# Patient Record
Sex: FEMALE | Race: WHITE
[De-identification: names, ages, dates, MRNs, and addresses within clinical notes are randomized per-mention and may not be internally consistent; named-entity substitution may affect disease eponyms.]

---

## 2017-01-07 ENCOUNTER — HOSPITAL ENCOUNTER (EMERGENCY)
Dept: HOSPITAL 75 - ER | Age: 67
Discharge: HOME | End: 2017-01-07
Payer: MEDICARE

## 2017-01-07 VITALS — SYSTOLIC BLOOD PRESSURE: 151 MMHG | DIASTOLIC BLOOD PRESSURE: 92 MMHG

## 2017-01-07 VITALS — BODY MASS INDEX: 37.9 KG/M2 | HEIGHT: 64 IN | WEIGHT: 222 LBS

## 2017-01-07 DIAGNOSIS — J18.9: Primary | ICD-10-CM

## 2017-01-07 LAB
ALBUMIN SERPL-MCNC: 4.3 G/DL (ref 3.2–4.5)
ALT SERPL-CCNC: 31 U/L (ref 0–55)
ANION GAP SERPL CALC-SCNC: 12 MMOL/L (ref 5–14)
AST SERPL-CCNC: 29 U/L (ref 5–34)
BASOPHILS # BLD AUTO: 0.1 10^3/UL (ref 0–0.1)
BASOPHILS NFR BLD AUTO: 1 % (ref 0–10)
BILIRUB SERPL-MCNC: 0.8 MG/DL (ref 0.1–1)
BUN SERPL-MCNC: 10 MG/DL (ref 7–18)
BUN/CREAT SERPL: 14
CALCIUM SERPL-MCNC: 9.5 MG/DL (ref 8.5–10.1)
CHLORIDE SERPL-SCNC: 109 MMOL/L (ref 98–107)
CO2 SERPL-SCNC: 20 MMOL/L (ref 21–32)
CREAT SERPL-MCNC: 0.73 MG/DL (ref 0.6–1.3)
EOSINOPHIL # BLD AUTO: 0.1 10^3/UL (ref 0–0.3)
EOSINOPHIL NFR BLD AUTO: 1 % (ref 0–10)
ERYTHROCYTE [DISTWIDTH] IN BLOOD BY AUTOMATED COUNT: 15.4 % (ref 10–14.5)
GFR SERPLBLD BASED ON 1.73 SQ M-ARVRAT: > 60 ML/MIN
GLUCOSE SERPL-MCNC: 119 MG/DL (ref 70–105)
LYMPHOCYTES # BLD AUTO: 1.7 X 10^3 (ref 1–4)
LYMPHOCYTES NFR BLD AUTO: 19 % (ref 12–44)
MCH RBC QN AUTO: 31 PG (ref 25–34)
MCHC RBC AUTO-ENTMCNC: 35 G/DL (ref 32–36)
MCV RBC AUTO: 89 FL (ref 80–99)
MONOCYTES # BLD AUTO: 1.3 X 10^3 (ref 0–1)
MONOCYTES NFR BLD AUTO: 15 % (ref 0–12)
NEUTROPHILS # BLD AUTO: 5.5 X 10^3 (ref 1.8–7.8)
NEUTROPHILS NFR BLD AUTO: 64 % (ref 42–75)
PLATELET # BLD: 291 10^3/UL (ref 130–400)
PMV BLD AUTO: 9.8 FL (ref 7.4–10.4)
POTASSIUM SERPL-SCNC: 4.3 MMOL/L (ref 3.6–5)
PROT SERPL-MCNC: 7.4 G/DL (ref 6.4–8.2)
RBC # BLD AUTO: 4.76 10^6/UL (ref 4.35–5.85)
SODIUM SERPL-SCNC: 141 MMOL/L (ref 135–145)
WBC # BLD AUTO: 8.7 10^3/UL (ref 4.3–11)

## 2017-01-07 PROCEDURE — 96374 THER/PROPH/DIAG INJ IV PUSH: CPT

## 2017-01-07 PROCEDURE — 71020: CPT

## 2017-01-07 PROCEDURE — 87040 BLOOD CULTURE FOR BACTERIA: CPT

## 2017-01-07 PROCEDURE — 83605 ASSAY OF LACTIC ACID: CPT

## 2017-01-07 PROCEDURE — 36415 COLL VENOUS BLD VENIPUNCTURE: CPT

## 2017-01-07 PROCEDURE — 85025 COMPLETE CBC W/AUTO DIFF WBC: CPT

## 2017-01-07 PROCEDURE — 80053 COMPREHEN METABOLIC PANEL: CPT

## 2017-01-07 NOTE — DIAGNOSTIC IMAGING REPORT
INDICATION: Left-sided chest pain.



FINDINGS:

There is minimal opacity in the left base posteriorly, likely

partial atelectasis. No definite pleural fluid. The right lung

is clear.



IMPRESSION:

Minimal left basilar opacity suggests subsegmental atelectasis.



Dictated by:



Dictated on workstation # [id number]

## 2017-01-07 NOTE — ED RESPIRATORY
General


Chief Complaint:  General Problems/Pain


Stated Complaint:  L SIDE/SHOULDER/ARM PAIN


Nursing Triage Note:  


Pt presents to ED with c/o left side abdominal pain that radiate up into left 


shoulder, pt reports pain is worse with deep breaths, crackles noted to LLL, 


temp 99.6 in triage, last dose of Tylenol at 0400.


Source:  patient, RN/MD


Exam Limitations:  no limitations





History of Present Illness


Time seen by provider:  13:56


Initial Comments


This 66-year-old white female presents with left-sided chest pain made worse 

with deep breath and cough.  Patient has had associated fever.  





The patient's chest pain and cough began yesterday.  The patient has had no 

associated nausea, vomiting, diarrhea, dysuria, radiation of the sharp left-

sided chest pain, or similar episode in the past.





Patient's cough is nonproductive.





Allergies and Home Medications


Allergies


Coded Allergies:  


     Sulfa (Sulfonamide Antibiotics) (Unverified  Allergy, Unknown, RASH, N/V, 

11/16/16)





Home Medications


Folic Acid/Multivit-Min/Lutein 1 Each Combo..pkg 1 EACH PO DAILY (Reported) 





Constitutional:   chills fever


EENTM:  No ear pain, No throat pain


Respiratory:   coughNo short of breath


Cardiovascular:  No chest pain


Gastrointestinal:  No abdominal pain, No diarrhea, No nausea, No vomiting


Genitourinary:  No dysuria, No frequency


Musculoskeletal:  No back pain


Skin:  No rash


Psychiatric/Neurological:   No Symptoms Reported


Hematologic/Lymphatic:   No Symptoms Reported





Past Medical-Social-Family Hx


Patient Social History


Alcohol Use:  Denies Use


Recreational Drug Use:  No


Smoking Status:  Never a Smoker


Recent Foreign Travel:  No


Contact w/Someone Who Travel:  No


Recent Infectious Disease Expo:  No


Recent Hopitalizations:  No


Physical Abuse Screen:  No


Sexual Abuse:  No





Immunizations Up To Date


Date of Pneumonia Vaccine:  Nov 16, 2008





Seasonal Allergies


Seasonal Allergies:  Yes





Surgeries


HX Surgeries:  Yes (BASAL CELL REMOVED FROM UPPER LIP, LESION FROM LEG, D&C,

WISDOM TEETH, herni)


Surgeries:  Hysterectomy





Respiratory


Hx Respiratory Disorders:  No





Cardiovascular


Hx Cardiac Disorders:  No





Neurological


Hx Neurological Disorders:  No





Reproductive System


Hx Reproductive Disorders:  No


Sexually Transmitted Disease:  No


HIV/AIDS:  No





Genitourinary


Hx Genitourinary Disorders:  No





Gastrointestinal


Hx Gastrointestinal Disorders:  Yes


Gastrointestinal Disorders:  Diverticulosis





Musculoskeletal


Hx Musculoskeletal Disorders:  No





Endocrine


Hx Endocrine Disorders:  No





HEENT


HX ENT Disorders:  Yes (GLASSES)


Loss of Vision:  Bilateral





Cancer


Hx Cancer:  Yes (ENDOMETRIAL CANCER, BASAL CELL SKIN CA)


Cancer:  Skin, Uterine





Psychosocial


Hx Psychiatric Problems:  No





Integumentary


HX Skin/Integumentary Disorder:  No





Blood Transfusions


Hx Blood Disorders:  No


Adverse Reaction to a Blood Tr:  No





Reviewed Nursing Assessment


Reviewed/Agree w Nursing PMH:  Yes





Physical Exam


Vital Signs





 Vital Sign - Last 12Hours








 1/7/17





 12:38


 


Temp 99.6


 


Pulse 88


 


Resp 20


 


B/P 165/83


 


Pulse Ox 96


 


O2 Delivery Room Air





Capillary Refill : Less Than 3 Seconds


General Appearance:   WD/WN no apparent distress


Eyes:  Bilateral Eye Normal Inspection


HEENT:   normal ENT inspection


Neck:   normal inspection


Respiratory:   rales


Cardiovascular:   regular rate, rhythm


Gastrointestinal:   normal bowel sounds non tender soft


Extremities:   normal range of motion non-tender


Skin:   normal color warm/dry





Progress/Results/Core Measures


Results/Orders


Lab Results





 Laboratory Tests








Test


  1/7/17


12:55 1/7/17


13:30 Range/Units


 


 


Basophils # (Auto)


  0.1 


  


  0.0-0.1


10^3/uL


 


Basophils (%) (Auto) 1   0-10  %


 


Eosinophils # (Auto)


  0.1 


  


  0.0-0.3


10^3/uL


 


Eosinophils (%) (Auto) 1   0-10  %


 


Hematocrit 42   35-52  %


 


Hemoglobin 14.7   11.5-16.0  G/DL


 


Lymphocytes # (Auto) 1.7   1.0-4.0  X 10^3


 


Lymphocytes (%) (Auto) 19   12-44  %


 


Mean Corpuscular Hemoglobin 31   25-34  PG


 


Mean Corpuscular Hemoglobin


Concent 35 


  


  32-36  G/DL


 


 


Mean Corpuscular Volume 89   80-99  FL


 


Mean Platelet Volume 9.8   7.4-10.4  FL


 


Monocytes # (Auto) 1.3 H  0.0-1.0  X 10^3


 


Monocytes (%) (Auto) 15 H  0-12  %


 


Neutrophils # (Auto) 5.5   1.8-7.8  X 10^3


 


Neutrophils (%) (Auto) 64   42-75  %


 


Platelet Count


  291 


  


  130-400


10^3/uL


 


Red Blood Count


  4.76 


  


  4.35-5.85


10^6/uL


 


Red Cell Distribution Width 15.4 H  10.0-14.5  %


 


White Blood Count


  8.7 


  


  4.3-11.0


10^3/uL


 


Alanine Aminotransferase


(ALT/SGPT) 


  31 


  0-55  U/L


 


 


Albumin  4.3  3.2-4.5  G/DL


 


Alkaline Phosphatase  79    U/L


 


Anion Gap  12  5-14  MMOL/L


 


Aspartate Amino Transf


(AST/SGOT) 


  29 


  5-34  U/L


 


 


BUN/Creatinine Ratio  14   


 


Blood Urea Nitrogen  10  7-18  MG/DL


 


Calcium Level  9.5  8.5-10.1  MG/DL


 


Carbon Dioxide Level  20 L 21-32  MMOL/L


 


Chloride Level  109 H   MMOL/L


 


Creatinine


  


  0.73 


  0.60-1.30


MG/DL


 


Estimat Glomerular Filtration


Rate 


  > 60 


   


 


 


Glucose Level  119 H   MG/DL


 


Lactic Acid Level  1.5  0.5-2.0  MMOL/L


 


Potassium Level  4.3  3.6-5.0  MMOL/L


 


Sodium Level  141  135-145  MMOL/L


 


Total Bilirubin  0.8  0.1-1.0  MG/DL


 


Total Protein  7.4  6.4-8.2  G/DL








My Orders





 Orders-VERONICA PIÑA MD


Cbc With Automated Diff (1/7/17 13:12)


Comprehensive Metabolic Panel (1/7/17 13:12)


Lactic Acid Analyzer (1/7/17 13:26)


Blood Culture (1/7/17 13:26)


Chest Pa/Lat (2 View) (1/7/17 13:34)


Ceftriaxone Injection (Rocephin Injectio (1/7/17 14:15)


Rocephin 1 Gm Iv (1 X Dose) (1/7/17 14:30)


Azithromycin Tablet (Zithromax Tablet) (1/7/17 14:30)





Vital Signs/I&O





 Vital Sign - Last 12Hours








 1/7/17





 12:38


 


Temp 99.6


 


Pulse 88


 


Resp 20


 


B/P 165/83


 


Pulse Ox 96


 


O2 Delivery Room Air








Blood Pressure Mean:  110


Progress Note :  


   Time:  14:26


Progress Note


The patient's CBC demonstrated unremarkable white count.  There was evidence of 

a left lower lobe atelectasis or infiltrate.





I discussed treatment options with the patient and she wishes to go home.





Patient received a gram of Rocephin IV and 500 mg of azathioprine myosin orally





I'll have the patient return for another gram of Rocephin tomorrow and ask her 

to finish her azithromycin over the next 4 days.  I asked that she follow up 

closely with her primary care physician on Monday.





Departure


Impression


Impression:  


 Primary Impression:  


 Pneumonia


 Qualified Code:  J18.1 - Lobar pneumonia, unspecified organism


Disposition:  01 HOME, SELF-CARE


Condition:  Improved





Departure-Patient Inst.


Decision time for Depature:  14:32


Referrals:  


DASHA KNIGHT MD (PCP)


Primary Care Physician


Patient Instructions:  Community-Acquired Pneumonia, Adult (DC)





Add. Discharge Instructions:


Return tomorrow to the emergency department for a gram of Rocephin IV.  

Zithromax as prescribed.  Vicodin for pain.  Follow-up with your doctor on 

Monday.  Return to the emergency department if you have any acute problems or 

questions.  All discharge instructions reviewed with patient and/or family. 

Voiced understanding.


Scripts


Azithromycin (Zithromax)250 Mg [id number] Mg PO UD #6 TAB


   TAKE 2 TABLETS TODAY, THEN TAKE 1 TABLET DAILY FOR 4 MORE DAYS


   Prov:VERONICA PIÑA MD         1/7/17








VERONICA PIÑA MD Jan 7, 2017 13:59

## 2017-01-08 ENCOUNTER — HOSPITAL ENCOUNTER (EMERGENCY)
Dept: HOSPITAL 75 - ER | Age: 67
Discharge: HOME | End: 2017-01-08
Payer: MEDICARE

## 2017-01-08 VITALS — HEIGHT: 64 IN | BODY MASS INDEX: 37.9 KG/M2 | WEIGHT: 222 LBS

## 2017-01-08 VITALS — DIASTOLIC BLOOD PRESSURE: 76 MMHG | SYSTOLIC BLOOD PRESSURE: 127 MMHG

## 2017-01-08 DIAGNOSIS — J06.9: Primary | ICD-10-CM

## 2017-01-08 PROCEDURE — 96372 THER/PROPH/DIAG INJ SC/IM: CPT

## 2017-01-08 PROCEDURE — 99282 EMERGENCY DEPT VISIT SF MDM: CPT

## 2017-01-08 NOTE — ED COUGH/URI
General


Chief Complaint:  Cough/Cold/Flu Symptoms


Stated Complaint:  NEEDS IV


Nursing Triage Note:  


here as instructed from 1/7/17 for repeat antibiotic IM, pt with dx pneumonia


Source:  patient


Exam Limitations:  no limitations





History of Present Illness


Time seen by provider:  11:13


Initial Comments


Patient was seen in emergency department yesterday for an acute upper 

respiratory infection and started on Rocephin and Zithromax.  She returns for a 

Rocephin injection today.





Patient reports significant interim improvement.





Allergies and Home Medications


Allergies


Coded Allergies:  


     Sulfa (Sulfonamide Antibiotics) (Unverified  Allergy, Unknown, RASH, N/V, 

11/16/16)





Home Medications


Azithromycin 250 Mg Tablet #6 250 MG PO UD 


   TAKE 2 TABLETS TODAY, THEN TAKE 1 TABLET DAILY FOR 4 MORE DAYS 


   Prescribed by: VERONICA PIÑA MD on 1/7/17 1433


Folic Acid/Multivit-Min/Lutein 1 Each Combo..pkg 1 EACH PO DAILY (Reported) 





Constitutional:   chills (patient was having chills yesterday which have now 

abated.)


EENTM:  No ear pain, No throat pain


Respiratory:   see HPI cough


Cardiovascular:  No chest pain (patient states that her cough is improving last 

24 hours)


Gastrointestinal:  No abdominal pain, No diarrhea, No nausea


Genitourinary:  No dysuria, No frequency


Musculoskeletal:  No muscle pain


Skin:  No rash


Psychiatric/Neurological:   No Symptoms Reported


Hematologic/Lymphatic:   No Symptoms Reported





Past Medical-Social-Family Hx


Patient Social History


Alcohol Use:  Denies Use


Recreational Drug Use:  No


Smoking Status:  Never a Smoker


Recent Foreign Travel:  No


Contact w/Someone Who Travel:  No


Recent Infectious Disease Expo:  No


Recent Hopitalizations:  No


Physical Abuse Screen:  No


Sexual Abuse:  No





Immunizations Up To Date


Date of Pneumonia Vaccine:  Nov 16, 2008





Seasonal Allergies


Seasonal Allergies:  Yes





Surgeries


HX Surgeries:  Yes (BASAL CELL REMOVED FROM UPPER LIP, LESION FROM LEG, D&C,

WISDOM TEETH, herni)


Surgeries:  Hysterectomy





Respiratory


Hx Respiratory Disorders:  No





Cardiovascular


Hx Cardiac Disorders:  No





Neurological


Hx Neurological Disorders:  No





Reproductive System


Hx Reproductive Disorders:  No


Sexually Transmitted Disease:  No


HIV/AIDS:  No





Genitourinary


Hx Genitourinary Disorders:  No





Gastrointestinal


Hx Gastrointestinal Disorders:  Yes


Gastrointestinal Disorders:  Diverticulosis





Musculoskeletal


Hx Musculoskeletal Disorders:  No





Endocrine


Hx Endocrine Disorders:  No





HEENT


HX ENT Disorders:  Yes (GLASSES)


Loss of Vision:  Bilateral





Cancer


Hx Cancer:  Yes (ENDOMETRIAL CANCER, BASAL CELL SKIN CA)


Cancer:  Skin, Uterine





Psychosocial


Hx Psychiatric Problems:  No





Integumentary


HX Skin/Integumentary Disorder:  No





Blood Transfusions


Hx Blood Disorders:  No


Adverse Reaction to a Blood Tr:  No





Reviewed Nursing Assessment


Reviewed/Agree w Nursing PMH:  Yes





Physical Exam


Vital Signs





 Vital Sign - Last 12Hours








 1/8/17





 10:58


 


Temp 98.4


 


Pulse 83


 


Resp 18


 


B/P 127/76


 


Pulse Ox 92


 


O2 Delivery Room Air





Capillary Refill : Less Than 3 Seconds


General Appearance:   WD/WN no apparent distress


Eyes:  Bilateral Eye Normal Inspection


HEENT:   normal ENT inspection


Neck:   normal inspection


Respiratory:   lungs clear


Cardiovascular:   regular rate, rhythm


Gastrointestinal:   normal bowel sounds non tender soft


Extremities:   normal inspection


Neurologic/Psychiatric:   no motor/sensory deficits


Skin:   normal color warm/dry





Progress/Results/Core Measures


Results/Orders


My Orders





 Orders-VERONICA PIÑA MD


Ceftriaxone Injection (Rocephin Injectio (1/8/17 11:15)


Lidocaine 1% Injection (Xylocaine 1% Inj (1/8/17 11:15)





Medications Given in ED





 Current Medications








 Medications  Dose


 Ordered  Sig/Watson


 Route  Start Time


 Stop Time Status Last Admin


Dose Admin


 


 Ceftriaxone Sodium  1,000 mg  ONCE  ONCE


 IM  1/8/17 11:15


 1/8/17 11:16  1/8/17 11:11


1,000 MG


 


 Lidocaine HCl  2.1 ml  ONCE  ONCE


 INJ  1/8/17 11:15


 1/8/17 11:16  1/8/17 11:12


2.1 ML








Vital Signs/I&O





 Vital Sign - Last 12Hours








 1/8/17 1/8/17 1/8/17 1/8/17





 10:58 10:58 11:11 11:12


 


Temp  98.4 98.4 98.4


 


Pulse  83  


 


Resp  18  


 


B/P  127/76  


 


Pulse Ox  92  


 


O2 Delivery Room Air Room Air  








Blood Pressure Mean:  93


Progress Note :  


   Time:  11:18


Progress Note


Patient received a gram Rocephin IM.





Patient was asked follow up with her caregiver tomorrow for further evaluation 

and care.  She was asked continue with the Z-Manny that was started yesterday.





Departure


Impression


Impression:  


 Primary Impression:  


 Upper respiratory infection


 Qualified Code:  J06.9 - Acute upper respiratory infection, unspecified


Disposition:  01 HOME, SELF-CARE


Condition:  Improved





Departure-Patient Inst.


Decision time for Depature:  11:19


Referrals:  


DASHA KNIGHT MD (PCP)


Primary Care Physician


Patient Instructions:  Acute Bronchitis, Adult (DC)





Add. Discharge Instructions:


Close follow-up with her doctor tomorrow.  Continue with the Zithromax.  Return 

if any problems.  All discharge instructions reviewed with patient and/or 

family. Voiced understanding.








VERONICA PIÑA MD Jan 8, 2017 11:19

## 2017-12-12 ENCOUNTER — HOSPITAL ENCOUNTER (OUTPATIENT)
Dept: HOSPITAL 75 - LAB | Age: 67
End: 2017-12-12
Attending: FAMILY MEDICINE
Payer: MEDICARE

## 2017-12-12 ENCOUNTER — HOSPITAL ENCOUNTER (OUTPATIENT)
Dept: HOSPITAL 75 - ONC | Age: 67
End: 2017-12-12
Attending: INTERNAL MEDICINE
Payer: MEDICARE

## 2017-12-12 DIAGNOSIS — K43.9: ICD-10-CM

## 2017-12-12 DIAGNOSIS — E78.00: Primary | ICD-10-CM

## 2017-12-12 DIAGNOSIS — R53.81: ICD-10-CM

## 2017-12-12 DIAGNOSIS — Z08: Primary | ICD-10-CM

## 2017-12-12 DIAGNOSIS — Z90.710: ICD-10-CM

## 2017-12-12 DIAGNOSIS — Z85.42: ICD-10-CM

## 2017-12-12 DIAGNOSIS — R53.83: ICD-10-CM

## 2017-12-12 LAB
ALBUMIN SERPL-MCNC: 4.3 GM/DL (ref 3.2–4.5)
ALT SERPL-CCNC: 41 U/L (ref 0–55)
ANION GAP SERPL CALC-SCNC: 10 MMOL/L (ref 5–14)
AST SERPL-CCNC: 33 U/L (ref 5–34)
BASOPHILS # BLD AUTO: 0 10^3/UL (ref 0–0.1)
BASOPHILS NFR BLD AUTO: 1 % (ref 0–10)
BILIRUB SERPL-MCNC: 0.6 MG/DL (ref 0.1–1)
BUN SERPL-MCNC: 15 MG/DL (ref 7–18)
BUN/CREAT SERPL: 19
CALCIUM SERPL-MCNC: 9.1 MG/DL (ref 8.5–10.1)
CHLORIDE SERPL-SCNC: 106 MMOL/L (ref 98–107)
CHOLEST SERPL-MCNC: 229 MG/DL (ref ?–200)
CO2 SERPL-SCNC: 25 MMOL/L (ref 21–32)
CREAT SERPL-MCNC: 0.77 MG/DL (ref 0.6–1.3)
EOSINOPHIL # BLD AUTO: 0.1 10^3/UL (ref 0–0.3)
EOSINOPHIL NFR BLD AUTO: 1 % (ref 0–10)
ERYTHROCYTE [DISTWIDTH] IN BLOOD BY AUTOMATED COUNT: 12.6 % (ref 10–14.5)
GFR SERPLBLD BASED ON 1.73 SQ M-ARVRAT: > 60 ML/MIN
GLUCOSE SERPL-MCNC: 100 MG/DL (ref 70–105)
LDLC SERPL DIRECT ASSAY-MCNC: 151 MG/DL (ref 1–129)
LYMPHOCYTES # BLD AUTO: 1.7 X 10^3 (ref 1–4)
LYMPHOCYTES NFR BLD AUTO: 34 % (ref 12–44)
MCH RBC QN AUTO: 31 PG (ref 25–34)
MCHC RBC AUTO-ENTMCNC: 34 G/DL (ref 32–36)
MCV RBC AUTO: 92 FL (ref 80–99)
MONOCYTES # BLD AUTO: 0.8 X 10^3 (ref 0–1)
MONOCYTES NFR BLD AUTO: 16 % (ref 0–12)
NEUTROPHILS # BLD AUTO: 2.3 X 10^3 (ref 1.8–7.8)
NEUTROPHILS NFR BLD AUTO: 47 % (ref 42–75)
PLATELET # BLD: 223 10^3/UL (ref 130–400)
PMV BLD AUTO: 9.7 FL (ref 7.4–10.4)
POTASSIUM SERPL-SCNC: 3.9 MMOL/L (ref 3.6–5)
PROT SERPL-MCNC: 7.2 GM/DL (ref 6.4–8.2)
RBC # BLD AUTO: 4.59 10^6/UL (ref 4.35–5.85)
SODIUM SERPL-SCNC: 141 MMOL/L (ref 135–145)
TRIGL SERPL-MCNC: 164 MG/DL (ref ?–150)
TSH SERPL-ACNC: 1.48 UIU/ML (ref 0.35–4.94)
VLDLC SERPL CALC-MCNC: 33 MG/DL (ref 5–40)
WBC # BLD AUTO: 4.9 10^3/UL (ref 4.3–11)

## 2017-12-12 PROCEDURE — 85025 COMPLETE CBC W/AUTO DIFF WBC: CPT

## 2017-12-12 PROCEDURE — 99213 OFFICE O/P EST LOW 20 MIN: CPT

## 2017-12-12 PROCEDURE — 36415 COLL VENOUS BLD VENIPUNCTURE: CPT

## 2017-12-12 PROCEDURE — 84443 ASSAY THYROID STIM HORMONE: CPT

## 2017-12-12 PROCEDURE — 80053 COMPREHEN METABOLIC PANEL: CPT

## 2017-12-12 PROCEDURE — 80061 LIPID PANEL: CPT

## 2017-12-21 ENCOUNTER — HOSPITAL ENCOUNTER (OUTPATIENT)
Dept: HOSPITAL 75 - RAD | Age: 67
End: 2017-12-21
Attending: INTERNAL MEDICINE
Payer: MEDICARE

## 2017-12-21 DIAGNOSIS — M85.80: Primary | ICD-10-CM

## 2017-12-21 DIAGNOSIS — Z78.0: ICD-10-CM

## 2017-12-21 PROCEDURE — 77080 DXA BONE DENSITY AXIAL: CPT

## 2017-12-21 NOTE — DIAGNOSTIC IMAGING REPORT
EXAMINATION: DEXA scan.



INDICATION: Osteopenia.



TECHNIQUE: Bone mineral density estimated based on dual energy

radiography over the lumbar spine and femoral necks, was

performed.



FINDINGS:

The lumbar spine T-score is -0.8.



T score over the femoral neck on both sides is -0.3.



IMPRESSION: Bone mineral density measurements are near the lower

limits of normal.



Dictated by: 



  Dictated on workstation # [id number]

## 2018-11-02 ENCOUNTER — HOSPITAL ENCOUNTER (OUTPATIENT)
Dept: HOSPITAL 75 - RAD | Age: 68
End: 2018-11-02
Attending: FAMILY MEDICINE
Payer: MEDICARE

## 2018-11-02 DIAGNOSIS — Z12.31: Primary | ICD-10-CM

## 2018-11-02 PROCEDURE — 77067 SCR MAMMO BI INCL CAD: CPT

## 2018-11-02 NOTE — DIAGNOSTIC IMAGING REPORT
INDICATION: Routine screening.



COMPARISON: Prior mammogram from 10/31/2017 and 12/18/2015.



EXAMINATION: 2D and 3D bilateral screening mammography was

performed with CAD. 



The current study was also evaluated with a Computer Aided

Detection (CAD) system.



FINDINGS: Scattered fibroglandular densities are identified,

bilaterally. Benign-appearing nodular densities in both breasts

appear stable. No spiculated mass or malignant appearing

microcalcifications are seen. The axillae are unremarkable.



IMPRESSION: No mammographic features suspicious for malignancy

are identified. 



ACR BI-RADS Category 2: Benign findings.

Result letter will be mailed to the patient.

Note: At least 10% of breast cancer is not imaged by mammography.



Dictated by: 



  Dictated on workstation # [id number]

## 2018-11-07 ENCOUNTER — HOSPITAL ENCOUNTER (OUTPATIENT)
Dept: HOSPITAL 75 - PREOP | Age: 68
Discharge: HOME | End: 2018-11-07
Attending: SURGERY
Payer: MEDICARE

## 2018-11-07 VITALS — WEIGHT: 222 LBS | BODY MASS INDEX: 37.9 KG/M2 | HEIGHT: 64 IN

## 2018-11-07 DIAGNOSIS — Z01.818: Primary | ICD-10-CM

## 2018-11-12 ENCOUNTER — HOSPITAL ENCOUNTER (OUTPATIENT)
Dept: HOSPITAL 75 - ENDO | Age: 68
Discharge: HOME | End: 2018-11-12
Attending: SURGERY
Payer: MEDICARE

## 2018-11-12 VITALS — HEIGHT: 64 IN | BODY MASS INDEX: 37.9 KG/M2 | WEIGHT: 222 LBS

## 2018-11-12 VITALS — SYSTOLIC BLOOD PRESSURE: 152 MMHG | DIASTOLIC BLOOD PRESSURE: 85 MMHG

## 2018-11-12 VITALS — SYSTOLIC BLOOD PRESSURE: 169 MMHG | DIASTOLIC BLOOD PRESSURE: 78 MMHG

## 2018-11-12 VITALS — SYSTOLIC BLOOD PRESSURE: 147 MMHG | DIASTOLIC BLOOD PRESSURE: 70 MMHG

## 2018-11-12 VITALS — DIASTOLIC BLOOD PRESSURE: 85 MMHG | SYSTOLIC BLOOD PRESSURE: 152 MMHG

## 2018-11-12 DIAGNOSIS — Z88.2: ICD-10-CM

## 2018-11-12 DIAGNOSIS — Z12.11: Primary | ICD-10-CM

## 2018-11-12 DIAGNOSIS — K57.90: ICD-10-CM

## 2018-11-12 DIAGNOSIS — Z85.828: ICD-10-CM

## 2018-11-12 DIAGNOSIS — Z85.42: ICD-10-CM

## 2018-11-12 NOTE — HISTORY & PHYSICIAL
History of Present Illness


History of Present Illness


Reason for visit/HPI


to undergo screening colonoscopy


Date of Admission


11/12/18


Date Seen by a Provider:  Nov 12, 2018


Time Seen by a Provider:  09:42


I consulted on this patient on


11/12/18


 09:41


Attending Physician


Kurtis Bee MD


Admitting Physician


Asad Sargent MD


Consult








Allergies and Home Medications


Allergies


Coded Allergies:  


     Sulfa (Sulfonamide Antibiotics) (Unverified  Allergy, Unknown, RASH, N/V, 

11/16/16)





Home Medications


Chlorpheniramine Maleate 4 Mg Tablet, 4 MG PO Q4H PRN for nasal congestion, (

Reported)


Phenylephrine/Dm/Acetaminop/GG 1 Each Tablet, 1 EACH PO Q6H PRN for CONGESTION, 

(Reported)





Patient Home Medication List


Home Medication List Reviewed:  Yes





Past Medical-Social-Family Hx


Patient Social History


Marrital Status:  


Employed/Student:  retired


Recent Foreign Travel:  No


Contact w/other who traveled:  No


Recent Hopitalizations:  No





Immunizations Up To Date


Date of Pneumonia Vaccine:  Nov 16, 2008





Seasonal Allergies


Seasonal Allergies:  Yes





Surgeries


Yes


Hysterectomy





Respiratory


No





Cardiovascular


No





Neurological


No





Reproductive System


Hx Reproductive Disorders:  No


Sexually Transmitted Disease:  No


HIV/AIDS:  No





Gastrointestinal


Yes


Diverticulosis





Musculoskeletal


No





HEENT


Loss of Vision:  Bilateral





Cancer


Yes


Skin, Uterine


Type of Treatment:  Surgical Intervention





Blood Transfusions


Adverse Reaction to a Blood Tr:  No





Review of Systems


Constitutional:  no symptoms reported


EENTM:  no symptoms reported


Respiratory:  no symptoms reported


Cardiovascular:  no symptoms reported


Gastrointestinal:  no symptoms reported


Genitourinary:  no symptoms reported


Musculoskeletal:  no symptoms reported


Skin:  no symptoms reported


Psychiatric/Neurological:  No Symptoms Reported





Physical Exam


Vital Signs


Capillary Refill :


Height, Weight, BMI


Height: 5'4.00"


Weight: 222lbs. 0.0oz. [id number]; 38.1 BMI


Method:Stated


General Appearance:  No Apparent Distress


Neck:  Normal Inspection


Respiratory:  Lungs Clear


Cardiovascular:  Regular Rate, Rhythm


Gastrointestinal:  Non Tender, Soft


Rectal:  Deferred


Neurologic/Psychiatric:  Alert, Oriented x3


Skin:  Warm/Dry





Assessment/Plan


Assessment and Plan


lady to undergo screening colonoscopy.  Discussed in detail





Admission Diagnosis


Admission Status:  Other (Outpt Proc)











KURTIS BEE MD Nov 12, 2018 09:43

## 2018-11-12 NOTE — XMS REPORT
Continuity of Care Document

 Created on: 2018



KINJAL FLETCHER

External Reference #: [id number]

: 1950

Sex: Female



Demographics







 Address  [address] [place]  [address]

 

 Home Phone  [phone] x

 

 Preferred Language  Unknown

 

 Marital Status  Unknown

 

 [employer] Affiliation  Unknown

 

 Race  Unknown

 

 Ethnic Group  Unknown





Author







 Author  [facility]

 

 Organization  [facility]

 

 Address  Unknown

 

 Phone  Unavailable



              



Allergies

      





 Active            Description            Code            Type            
Severity            Reaction            Onset            Reported/Identified   
         Relationship to Patient            Clinical Status        

 

 Yes            Sulfa (Sulfonamide Antibiotics)            [id number]          
  Drug Allergy            Unknown            RASH, N/V                         
2016                                  



                  



Medications

      



There is no data.                  



Problems

      





 Date Dx Coded            Attending            Type            Code            
Diagnosis            Diagnosed By        

 

 1599            TACHO MAYES MD            Ot            E66.01     
       MORBID (SEVERE) OBESITY DUE TO EXCESS CA                     

 

 1599            TACHO MAYES MD            Ot            L98.492    
        NON-PRS CHRONIC ULCER OF SKIN OF SITES W                     

 

 2011                         Ot            626.8            MENSTRUAL 
DISORDER NEC                     

 

 10/25/2011                         Ot            182.0            MALIG CARLINE 
CORPUS UTERI                     

 

 10/25/2011                         Ot            214.1            LIPOMA SKIN 
NEC                     

 

 2014            RENY ESQUIVEL            Ot            V10.42         
                         

 

 2014            RENY ESQUIVEL            Ot            V67.09         
                         

 

 2014            RENY ESQUIVEL            Ot            V88.01         
                         

 

 2015            PARAG PROCTOR ARNP            Ot            553.20   
                               

 

 2015            PARAG PROCTOR ARNP            Ot            V10.42   
                               

 

 2015            PARAG PROCTOR ARNP            Ot            V67.09   
                               

 

 2015            PARAG PROCTOR ARNP            Ot            V88.01   
                               

 

 2015            PARAG PROCTOR ARNP            Ot            553.20   
                               

 

 2015            PARAG PROCTOR ARNP            Ot            V10.42   
                               

 

 2015            PARAG PROCTOR ARNP            Ot            V67.09   
                               

 

 2015            PARAG PROCTOR ARNP            Ot            V88.01   
                               

 

 2015            PARAG PROCTOR ARNP            Ot            553.20   
                               

 

 2015            PARAG PROCTOR ARNP            Ot            V10.42   
                               

 

 2015            PARAG PROCTOR ARNP            Ot            V67.09   
                               

 

 2015            PROCTOR, HILAH S ARNP            Ot            V88.01   
                               

 

 2015                         Ot            626.6                        
          

 

 2015                         Ot            V49.81                       
           

 

 2015                         Ot            611.72                       
           

 

 2015                         Ot            V16.3                        
          

 

 2015                         Ot            V76.11                       
           

 

 2015                         Ot            626.8                        
          

 

 2015                         Ot            V72.83                       
           

 

 2015                         Ot            V74.8                        
          

 

 2015                         Ot            182.0                        
          

 

 2015                         Ot            753.10                       
           

 

 2015                         Ot            V81.5                        
          

 

 2015                         Ot            182.0                        
          

 

 2015                         Ot            V72.63                       
           

 

 2015                         Ot            V74.8                        
          

 

 2015                         Ot            182.0                        
          

 

 2015                         Ot            V10.42                       
           

 

 2015                         Ot            V45.77                       
           

 

 2015                         Ot            V67.09                       
           

 

 2015                         Ot            611.72                       
           

 

 2015                         Ot            V76.12                       
           

 

 2015                         Ot            610.0                        
          

 

 2015                         Ot            793.80                       
           

 

 2015                         Ot            610.0                        
          

 

 2015                         Ot            V10.42                       
           

 

 2015                         Ot            V45.77                       
           

 

 2015                         Ot            V67.09                       
           

 

 2015            PROCTORPARAG VALIENTE ARNP            Ot            V10.42   
                               

 

 2015            PROCTORPARAG VALIENTE ARNP            Ot            V45.77   
                               

 

 2015            PROCTORPARAG VALIENTE ARNP            Ot            V67.09   
                               

 

 2015            RENY ESQUIVEL N            Ot            553.20         
                         

 

 2015            ALVINLAURIENIMESH N            Ot            V10.42         
                         

 

 2015            LAURIE ESQUIVELNIMESH N            Ot            V45.77         
                         

 

 2015            RENY ESQUIVEL N            Ot            V67.09         
                         

 

 2015            PARAG PROCTOR ARNP            Ot            553.20   
                               

 

 2015            PROCTOR PARAG S ARNP            Ot            V10.42   
                               

 

 2015            HITESH PARAG S ARNP            Ot            V67.09   
                               

 

 2015            PROCTORPARAG VALIENTE ARNP            Ot            V88.01   
                               

 

 2015            HITESH PARAG VALIENTE ARNP            Ot            183.0    
                              

 

 2015            HITESH PARAG S ARNP            Ot            553.1    
                              

 

 2015            PROCTOR, HILAH S ARNP            Ot            553.20   
                               

 

 2015            PARAG PROCTOR ARNP            Ot            571.8    
                              

 

 2015            PARAG PROCTOR ARNP            Ot            789.03   
                               

 

 2015            RENY ESQUIVEL N            Ot            V10.42         
                         

 

 2015            ALVIN, LAURIENIMESH N            Ot            V67.09         
                         

 

 2015            ALVINRENY RODRIGUEZ N            Ot            V88.01         
                         

 

 2015            PARAG PROCTOR ARNP            Ot            553.20   
                               

 

 2015            PARAG PROCTOR ARNP            Ot            V10.42   
                               

 

 2015            PARAG PROCTOR ARNP            Ot            V67.09   
                               

 

 2015            PARAG PROCTOR ARNP            Ot            V88.01   
                               

 

 2015            RENY ESQUIVEL N            Ot            K43.9          
                        

 

 2015            ALVIN, BOBNIMESH N            Ot            Z08            
                      

 

 2015            ALVIN, RENY N            Ot            Z85.42         
                         

 

 2015            ALVINRENY N            Ot            Z90.710        
                          

 

 2016            ALVIN, BOBAN N            Ot            C54.1          
                        

 

 2016            ALVIN, BOBAN N            Ot            C54.1          
                        

 

 2016            ALVIN, BOBAN N            Ot            K43.9          
                        

 

 2016            ALVIN, BOBNIMESH N            Ot            Z08            
                      

 

 2016            ALVIN, BOBNIMESH N            Ot            Z85.42         
                         

 

 2016            ALVIN, BOBAN N            Ot            Z90.710        
                          

 

 2016            ALVIN, BOBNIMESH N            Ot            K43.9          
                        

 

 2016            ALVIN BOBNIMESH N            Ot            Z08            
                      

 

 2016            ALVIN, BOBAN N            Ot            Z85.42         
                         

 

 2016            ALVIN, BOBAN N            Ot            Z90.710        
                          

 

 2016            ALVIN, LAURIEAN N            Ot            K43.9          
  VENTRAL HERNIA WITHOUT OBSTRUCTION OR GA                     

 

 2016            ALVINRENY N            Ot            Z08            
ENCNTR FOR FOLLOW-UP EXAM AFTER TRTMT FO                     

 

 2016            RENY ESQUIVEL N            Ot            Z85.42         
   PERSONAL HISTORY OF MALIGNANT NEOPLASM O                     

 

 2016            ALVINLAURIE RODRIGUEZAN N            Ot            Z90.710        
    ACQUIRED ABSENCE OF BOTH CERVIX AND UTER                     

 

 03/15/2016            ALVINLAURIE RODRIGUEZAN N            Ot            K43.9          
                        

 

 03/15/2016            ALVIN, BOBAN N            Ot            Z08            
                      

 

 03/15/2016            RENY ESQUIVEL            Ot            Z85.42         
                         

 

 03/15/2016            RENY ESQUIVEL            Ot            Z90.710        
                          

 

 2016            PARAG PROCTOR ARNP            Ot            Z08      
      ENCNTR FOR FOLLOW-UP EXAM AFTER TRTMT FO                     

 

 2016            PARAG PROCTOR ARNP            Ot            Z85.42   
         PERSONAL HISTORY OF MALIGNANT NEOPLASM O                     

 

 2016            PROCTORPARAG VALIENTE ARNP            Ot            Z90.710  
          ACQUIRED ABSENCE OF BOTH CERVIX AND UTER                     

 

 2016            PARAG PROCTOR ARNP            Ot            Z08      
      ENCNTR FOR FOLLOW-UP EXAM AFTER TRTMT FO                     

 

 2016            PARAG PROCTOR ARNP            Ot            Z85.42   
         PERSONAL HISTORY OF MALIGNANT NEOPLASM O                     

 

 2016            PARAG PROCTOR ARNP            Ot            Z90.710  
          ACQUIRED ABSENCE OF BOTH CERVIX AND UTER                     

 

 2016            PARAG PROCTOR ARNP            Ot            Z08      
      ENCNTR FOR FOLLOW-UP EXAM AFTER TRTMT FO                     

 

 2016            PARAG PROCTOR ARNP            Ot            Z85.42   
         PERSONAL HISTORY OF MALIGNANT NEOPLASM O                     

 

 2016            PARAG PROCTOR ARNP            Ot            Z90.710  
          ACQUIRED ABSENCE OF BOTH CERVIX AND UTER                     

 

 2016                         Ot            V16.3            FAMILY HX-
BREAST MALIG                     

 

 2016                         Ot            V76.11            SCRN MAMMO-
HIGH RISK PT, MALIGNANT NEOPL                     

 

 2016                         Ot            626.8            MENSTRUAL 
DISORDER NEC                     

 

 2016                         Ot            V72.83            EXAM PRE-
OPERATIVE NEC                     

 

 2016                         Ot            V74.8            SCREEN-
BACTERIAL DIS NEC                     

 

 2016                         Ot            182.0            MALIG CARLINE 
CORPUS UTERI                     

 

 2016                         Ot            753.10            CYSTIC 
KIDNEY DISEASE, UNSPECIFIED                     

 

 2016                         Ot            V81.5            SCREEN FOR 
NEPHROPATHY                     

 

 2016                         Ot            182.0            MALIG CARLINE 
CORPUS UTERI                     

 

 2016                         Ot            V72.63            PRE-
PROCEDURAL LABORATORY EXAMINATION                     

 

 2016                         Ot            V74.8            SCREEN-
BACTERIAL DIS NEC                     

 

 2016                         Ot            182.0            MALIG CARLINE 
CORPUS UTERI                     

 

 2016                         Ot            V10.42            HX-UTERUS 
MALIGNANCY NEC                     

 

 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 
ABSENCE OF GENITAL ORGANS                     

 

 2016                         Ot            V67.09            SURGERY 
FOLLOW-UP, OTHER SURGERY                     

 

 2016                         Ot            611.72            LUMP OR 
MASS IN BREAST                     

 

 2016                         Ot            V76.12            OTH SCREEN 
MAMMO-MALIGN NEOPLASM OF ALVIN                     

 

 2016                         Ot            610.0            SOLITARY 
CYST OF BREAST                     

 

 2016                         Ot            793.80            UNSPEC 
ABNORMAL MAMMOGRAM                     

 

 2016                         Ot            610.0            SOLITARY 
CYST OF BREAST                     

 

 2016                         Ot            V10.42            HX-UTERUS 
MALIGNANCY NEC                     

 

 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 
ABSENCE OF GENITAL ORGANS                     

 

 2016                         Ot            V67.09            SURGERY 
FOLLOW-UP, OTHER SURGERY                     

 

 2016            PARAG PROCTOR ARNP            Ot            V10.42   
         HX-UTERUS MALIGNANCY NEC                     

 

 2016            PARAG PROTCOR ARNP            Ot            V45.77   
         ACQRD ABSENCE OF GENITAL ORGANS                     

 

 2016            PARAG PROCTOR ARNP            Ot            V67.09   
         SURGERY FOLLOW-UP, OTHER SURGERY                     

 

 2016            RENY ESQUIVEL            Ot            553.20         
   VENTRAL HERNIA NOS                     

 

 2016            RENY ESQUIVEL            Ot            V10.42         
   HX-UTERUS MALIGNANCY NEC                     

 

 2016            RENY ESQUIVEL            Ot            V45.77         
   ACQRD ABSENCE OF GENITAL ORGANS                     

 

 2016            RENY ESQUIVEL            Ot            V67.09         
   SURGERY FOLLOW-UP, OTHER SURGERY                     

 

 2016            PARAG PROCTOR ARNP            Ot            553.20   
         VENTRAL HERNIA NOS                     

 

 2016            PARAG PROCTOR ARNP            Ot            V10.42   
         HX-UTERUS MALIGNANCY NEC                     

 

 2016            PARAG PROCTOR ARNP            Ot            V67.09   
         SURGERY FOLLOW-UP, OTHER SURGERY                     

 

 2016            PARAG PROCTOR ARNP            Ot            V88.01   
         ACQUIRED ABSENCE OF BOTH CERVIX AND UTER                     

 

 2016            PARAG PROCTOR ARNP            Ot            183.0    
        MALIGN NEOPL OVARY                     

 

 2016            PARAG PROCTOR ARNP            Ot            553.1    
        UMBILICAL HERNIA                     

 

 2016            PARAG PROCTOR ARNP            Ot            553.20   
         VENTRAL HERNIA NOS                     

 

 2016            PARAG PROCTOR ARNP            Ot            571.8    
        CHRONIC LIVER DIS NEC                     

 

 2016            PARAG PROCTOR ARNP            Ot            789.03   
         ABDOMINAL PAIN, RIGHT LOWER QUADRANT                     

 

 2016            RENY ESQUIVEL            Ot            V10.42         
   HX-UTERUS MALIGNANCY NEC                     

 

 2016            RENY ESQUIVEL            Ot            V67.09         
   SURGERY FOLLOW-UP, OTHER SURGERY                     

 

 2016            RENY ESQUIVEL            Ot            V88.01         
   ACQUIRED ABSENCE OF BOTH CERVIX AND UTER                     

 

 2016            PARAG PROCTOR ARNP            Ot            553.20   
         VENTRAL HERNIA NOS                     

 

 2016            PARAG PROCTOR ARNP            Ot            V10.42   
         HX-UTERUS MALIGNANCY NEC                     

 

 2016            PARAG PROCTOR ARNP            Ot            V67.09   
         SURGERY FOLLOW-UP, OTHER SURGERY                     

 

 2016            PARAG PROCTOR ARNP            Ot            V88.01   
         ACQUIRED ABSENCE OF BOTH CERVIX AND UTER                     

 

 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          
  MALIGNANT NEOPLASM OF ENDOMETRIUM                     

 

 2016            RENY ESQUIVEL            Ot            K43.9          
  VENTRAL HERNIA WITHOUT OBSTRUCTION OR GA                     

 

 2016            RENY ESQUIVEL            Ot            Z08            
ENCNTR FOR FOLLOW-UP EXAM AFTER TRTMT FO                     

 

 2016            RENY ESQUIVEL            Ot            Z85.42         
   PERSONAL HISTORY OF MALIGNANT NEOPLASM O                     

 

 2016            RENY ESQUIVEL            Ot            Z90.710        
    ACQUIRED ABSENCE OF BOTH CERVIX AND UTER                     

 

 2016            PARAG PROCTOR ARNP            Ot            Z08      
      ENCNTR FOR FOLLOW-UP EXAM AFTER TRTMT FO                     

 

 2016            PROCTORPARAG ARNP            Ot            Z85.42   
         PERSONAL HISTORY OF MALIGNANT NEOPLASM O                     

 

 2016            PARAG PROCTOR ARNP            Ot            Z90.710  
          ACQUIRED ABSENCE OF BOTH CERVIX AND UTER                     

 

 2016            PARAG PROCTOR ARNP            Ot            553.20   
         VENTRAL HERNIA NOS                     

 

 2016            PARAG PROCTOR ARNP            Ot            V10.42   
         HX-UTERUS MALIGNANCY NEC                     

 

 2016            PARAG PROCTOR ARNP            Ot            V67.09   
         SURGERY FOLLOW-UP, OTHER SURGERY                     

 

 2016            PARAG PROCTOR ARNP            Ot            V88.01   
         ACQUIRED ABSENCE OF BOTH CERVIX AND UTER                     

 

 2016            PARAG PROCTOR ARNP            Ot            183.0    
        MALIGN NEOPL OVARY                     

 

 2016            PARAG PROCTOR ARNP            Ot            553.1    
        UMBILICAL HERNIA                     

 

 2016            PARAG PROCTOR ARNP            Ot            553.20   
         VENTRAL HERNIA NOS                     

 

 2016            PARAG PROCTOR ARNP            Ot            571.8    
        CHRONIC LIVER DIS NEC                     

 

 2016            PARAG PROCTOR ARNP            Ot            789.03   
         ABDOMINAL PAIN, RIGHT LOWER QUADRANT                     

 

 2016            RENY ESQUIVEL            Ot            V10.42         
   HX-UTERUS MALIGNANCY NEC                     

 

 2016            RENY ESQUIVEL            Ot            V67.09         
   SURGERY FOLLOW-UP, OTHER SURGERY                     

 

 2016            RENY ESQUIVEL MAYRA            Ot            V88.01         
   ACQUIRED ABSENCE OF BOTH CERVIX AND UTER                     

 

 2016            PARAG PROCTOR ARNP            Ot            553.20   
         VENTRAL HERNIA NOS                     

 

 2016            PARAG PROCTOR ARNP            Ot            V10.42   
         HX-UTERUS MALIGNANCY NEC                     

 

 2016            PARAG PROCTOR ARNP            Ot            V67.09   
         SURGERY FOLLOW-UP, OTHER SURGERY                     

 

 2016            PARAG PROCTOR ARNP            Ot            V88.01   
         ACQUIRED ABSENCE OF BOTH CERVIX AND UTER                     

 

 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          
  MALIGNANT NEOPLASM OF ENDOMETRIUM                     

 

 2016            RENY ESQUIVEL MAYRA            Ot            K43.9          
  VENTRAL HERNIA WITHOUT OBSTRUCTION OR GA                     

 

 2016            RENY ESQUIVEL N            Ot            Z08            
ENCNTR FOR FOLLOW-UP EXAM AFTER TRTMT FO                     

 

 2016            RENY ESQUIVEL N            Ot            Z85.42         
   PERSONAL HISTORY OF MALIGNANT NEOPLASM O                     

 

 2016            RENY ESQUIVEL N            Ot            Z90.710        
    ACQUIRED ABSENCE OF BOTH CERVIX AND UTER                     

 

 2016            PARAG PROCTOR S ARNP            Ot            Z08      
      ENCNTR FOR FOLLOW-UP EXAM AFTER TRTMT FO                     

 

 2016            PARAG PROCTOR S ARNP            Ot            Z85.42   
         PERSONAL HISTORY OF MALIGNANT NEOPLASM O                     

 

 2016            PROCTOR, PARAG S ARNP            Ot            Z90.710  
          ACQUIRED ABSENCE OF BOTH CERVIX AND UTER                     

 

 2016            RENY ESQUIVEL MAYRA            Ot            K43.9          
  VENTRAL HERNIA WITHOUT OBSTRUCTION OR GA                     

 

 2016            ALVINRENY RODRIGUEZ N            Ot            Z08            
ENCNTR FOR FOLLOW-UP EXAM AFTER TRTMT FO                     

 

 2016            RENY ESQUIVEL N            Ot            Z85.42         
   PERSONAL HISTORY OF MALIGNANT NEOPLASM O                     

 

 2016            ALVINRENY N            Ot            Z90.710        
    ACQUIRED ABSENCE OF BOTH CERVIX AND UTER                     

 

 2016            AYSHA HARVEY DOTT D            Ot            K43.2       
     INCISIONAL HERNIA WITHOUT OBSTRUCTION OR                     

 

 2016            HARVEY DO KRISS D            Ot            Z01.812     
       ENCOUNTER FOR PREPROCEDURAL LABORATORY E                     

 

 2016            HARVEY DO, KRISS D            Ot            Z11.2       
     ENCOUNTER FOR SCREENING FOR OTHER BACTER                     

 

 2016            HARVEY DO, KRISS D            Ot            K43.2       
     INCISIONAL HERNIA WITHOUT OBSTRUCTION OR                     

 

 2016            HARVEY DO KRISS D            Ot            Z01.812     
       ENCOUNTER FOR PREPROCEDURAL LABORATORY E                     

 

 2016            HARVEY DOAYSHATT D            Ot            Z11.2       
     ENCOUNTER FOR SCREENING FOR OTHER BACTER                     

 

 2016            HARVEY DOKRISS D            Ot            J98.11      
      ATELECTASIS                     

 

 2016            HARVEY DOKRISS D            Ot            K43.2       
     INCISIONAL HERNIA WITHOUT OBSTRUCTION OR                     

 

 2016            Coward DOKRISS D            Ot            Z53.31      
      LAPAROSCOPIC SURGICAL PROCEDURE CONVERTE                     

 

 2016            HARVEY DOKRISS            Ot            K43.2       
     INCISIONAL HERNIA WITHOUT OBSTRUCTION OR                     

 

 2016            Coward DOKRISS D            Ot            Z01.812     
       ENCOUNTER FOR PREPROCEDURAL LABORATORY E                     

 

 2016            HARVEY DOKRISS D            Ot            Z11.2       
     ENCOUNTER FOR SCREENING FOR OTHER BACTER                     

 

 2016            HARVEY DOKRISS D            Ot            J98.11      
      ATELECTASIS                     

 

 2016            HARVEY DOKRISS D            Ot            K43.2       
     INCISIONAL HERNIA WITHOUT OBSTRUCTION OR                     

 

 2016            Coward DOKRISS D            Ot            Z53.31      
      LAPAROSCOPIC SURGICAL PROCEDURE CONVERTE                     

 

 2016            HARVEYKRISS BURKETT DO            Ot            J98.11      
      ATELECTASIS                     

 

 2016            HARVEY DOKRISS D            Ot            K43.2       
     INCISIONAL HERNIA WITHOUT OBSTRUCTION OR                     

 

 2016            HARVEY DO KRISS D            Ot            Z53.31      
      LAPAROSCOPIC SURGICAL PROCEDURE CONVERTE                     

 

 2016            HARVEYKRISS BURKETT DO D            Ot            J98.11      
      ATELECTASIS                     

 

 2016            HARVEY DOAYSHATT D            Ot            K43.2       
     INCISIONAL HERNIA WITHOUT OBSTRUCTION OR                     

 

 2016            Coward DO KRISS D            Ot            Z53.31      
      LAPAROSCOPIC SURGICAL PROCEDURE CONVERTE                     

 

 2016            EUGENE BENITEZ, DASHA VICTORIA            Ot            R50.9       
     FEVER, UNSPECIFIED                     

 

 2016            DASHA KNIGHT MD            Ot            R60.0       
     LOCALIZED EDEMA                     

 

 2016            DASHA KNIGHT MD            Ot            R50.9       
     FEVER, UNSPECIFIED                     

 

 2016            DASHA KNIGHT MD            Ot            R60.0       
     LOCALIZED EDEMA                     

 

 2016            Coward KRISS GARBER            Ot            J98.11      
      ATELECTASIS                     

 

 2016            KRISS HARVEY DO            Ot            K43.2       
     INCISIONAL HERNIA WITHOUT OBSTRUCTION OR                     

 

 2016            KRISS HARVEY DO            Ot            Z53.31      
      LAPAROSCOPIC SURGICAL PROCEDURE CONVERTE                     

 

 2016            TACHO MAYES MD            Ot            E66.01     
       MORBID (SEVERE) OBESITY DUE TO EXCESS CA                     

 

 2016            TACHO MAYES MD, Ot            L98.492    
        NON-PRS CHRONIC ULCER OF SKIN OF SITES W                     

 

 2016                         Ot            V10.42            HX-UTERUS 
MALIGNANCY NEC                     

 

 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 
ABSENCE OF GENITAL ORGANS                     

 

 2016                         Ot            V67.09            SURGERY 
FOLLOW-UP, OTHER SURGERY                     

 

 2016            TACHO MAYES MD            Ot            E66.01     
       MORBID (SEVERE) OBESITY DUE TO EXCESS CA                     

 

 2016            TACHO MAYES MD, Ot            L98.492    
        NON-PRS CHRONIC ULCER OF SKIN OF SITES W                     

 

 2017            DASHA KNIGHT MD            Ot            R50.9       
     FEVER, UNSPECIFIED                     

 

 2017            DASHA KNIGHT MD            Ot            R60.0       
     LOCALIZED EDEMA                     

 

 2017            VERONICA PIÑA MD            Ot            J18.9       
     PNEUMONIA, UNSPECIFIED ORGANISM                     

 

 2017            WANG BENITEZ, VERONICA VALIENTE            Ot            R07.81      
      PLEURODYNIA                     

 

 2017            VERONICA PIÑA MD            Ot            J06.9       
     ACUTE UPPER RESPIRATORY INFECTION, UNSPE                     

 

 2017            VERONICA PIÑA MD            Ot            J18.9       
     PNEUMONIA, UNSPECIFIED ORGANISM                     

 

 2017            VERONICA PIÑA MD            Ot            R07.81      
      PLEURODYNIA                     

 

 01/10/2017            RENY ESQUIVEL            Ot            K43.9          
  VENTRAL HERNIA WITHOUT OBSTRUCTION OR GA                     

 

 01/10/2017            RENY ESQUIVEL            Ot            Z08            
ENCNTR FOR FOLLOW-UP EXAM AFTER TRTMT FO                     

 

 01/10/2017            RENY ESQUIVEL            Ot            Z85.42         
   PERSONAL HISTORY OF MALIGNANT NEOPLASM O                     

 

 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        
    ACQUIRED ABSENCE OF BOTH CERVIX AND UTER                     

 

 2017            EUGENE BENITEZ, DASHA VICTORIA            Ot            R50.9       
     FEVER, UNSPECIFIED                     

 

 2017            EUGENE BENITEZ, DASHA VICTORIA            Ot            R60.0       
     LOCALIZED EDEMA                     

 

 01/15/2017            WANG BENITEZ, VERONICA VALIENTE            Ot            J18.9       
     PNEUMONIA, UNSPECIFIED ORGANISM                     

 

 01/15/2017            WANG BENITEZ, VERONICA VALIENTE            Ot            R07.81      
      PLEURODYNIA                     

 

 2017            RENY ESQUIVEL            Ot            K43.9          
  VENTRAL HERNIA WITHOUT OBSTRUCTION OR GA                     

 

 2017            RENY ESQUIVEL            Ot            Z08            
ENCNTR FOR FOLLOW-UP EXAM AFTER TRTMT FO                     

 

 2017            RENY ESQUIVEL            Ot            Z85.42         
   PERSONAL HISTORY OF MALIGNANT NEOPLASM O                     

 

 2017            RENY ESQUIVEL            Ot            Z90.710        
    ACQUIRED ABSENCE OF BOTH CERVIX AND UTER                     

 

 2017            RENY ESQUIVEL            Ot            K43.9          
  VENTRAL HERNIA WITHOUT OBSTRUCTION OR GA                     

 

 2017            RENY ESQUIVEL            Ot            Z08            
ENCNTR FOR FOLLOW-UP EXAM AFTER TRTMT FO                     

 

 2017            RENY ESQUIVEL            Ot            Z85.42         
   PERSONAL HISTORY OF MALIGNANT NEOPLASM O                     

 

 2017            RENY ESQUIVEL            Ot            Z90.710        
    ACQUIRED ABSENCE OF BOTH CERVIX AND UTER                     

 

 2017            RENY ESQUIVEL            Ot            K43.9          
  VENTRAL HERNIA WITHOUT OBSTRUCTION OR GA                     

 

 2017            RENY ESQUIVEL            Ot            Z08            
ENCNTR FOR FOLLOW-UP EXAM AFTER TRTMT FO                     

 

 2017            RENY ESQUIVEL            Ot            Z85.42         
   PERSONAL HISTORY OF MALIGNANT NEOPLASM O                     

 

 2017            RENY ESQUIVEL            Ot            Z90.710        
    ACQUIRED ABSENCE OF BOTH CERVIX AND UTER                     

 

 2017            EUGENE BENITEZ, DASHA VICTORIA            Ot            Z12.31      
      ENCNTR SCREEN MAMMOGRAM FOR MALIGNANT NE                     

 

 2017            RENY ESQUIVEL            Ot            K43.9          
  VENTRAL HERNIA WITHOUT OBSTRUCTION OR GA                     

 

 2017            RENY ESQUIVEL N            Ot            Z08            
ENCNTR FOR FOLLOW-UP EXAM AFTER TRTMT FO                     

 

 2017            RENY ESQUIVEL            Ot            Z85.42         
   PERSONAL HISTORY OF MALIGNANT NEOPLASM O                     

 

 2017            RENY ESQUIVEL            Ot            Z90.710        
    ACQUIRED ABSENCE OF BOTH CERVIX AND UTER                     

 

 2017            RENY ESQUIVEL            Ot            Z78.0          
  ASYMPTOMATIC MENOPAUSAL STATE                     

 

 2017            RENY ESQUIVEL            Ot            K43.9          
  VENTRAL HERNIA WITHOUT OBSTRUCTION OR GA                     

 

 2017            RENY ESQUIVEL            Ot            Z08            
ENCNTR FOR FOLLOW-UP EXAM AFTER TRTMT FO                     

 

 2017            RENY ESQUIVEL            Ot            Z85.42         
   PERSONAL HISTORY OF MALIGNANT NEOPLASM O                     

 

 2017            RENY ESQUIVEL            Ot            Z90.710        
    ACQUIRED ABSENCE OF BOTH CERVIX AND UTER                     

 

 2017            RENY ESQUIVEL            Ot            Z78.0          
  ASYMPTOMATIC MENOPAUSAL STATE                     

 

 2017                         Ot            611.72            LUMP OR 
MASS IN BREAST                     

 

 2017                         Ot            V76.12            OTH SCREEN 
MAMMO-MALIGN NEOPLASM OF ALVIN                     

 

 2017                         Ot            610.0            SOLITARY 
CYST OF BREAST                     

 

 2017                         Ot            793.80            UNSPEC 
ABNORMAL MAMMOGRAM                     

 

 2017                         Ot            610.0            SOLITARY 
CYST OF BREAST                     

 

 2017                         Ot            V10.42            HX-UTERUS 
MALIGNANCY NEC                     

 

 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 
ABSENCE OF GENITAL ORGANS                     

 

 2017                         Ot            V67.09            SURGERY 
FOLLOW-UP, OTHER SURGERY                     

 

 2017            PARAG PROCTOR ARNP            Ot            V10.42   
         HX-UTERUS MALIGNANCY NEC                     

 

 2017            PARAG PROCTOR ARNP            Ot            V45.77   
         ACQRD ABSENCE OF GENITAL ORGANS                     

 

 2017            PARAG PROCTOR ARNP            Ot            V67.09   
         SURGERY FOLLOW-UP, OTHER SURGERY                     

 

 2017            RENY ESQUIVEL            Ot            553.20         
   VENTRAL HERNIA NOS                     

 

 2017            RENY ESQUIVEL            Ot            V10.42         
   HX-UTERUS MALIGNANCY NEC                     

 

 2017            RENY ESQUIVEL            Ot            V45.77         
   ACQRD ABSENCE OF GENITAL ORGANS                     

 

 2017            RENY ESQUIVEL            Ot            V67.09         
   SURGERY FOLLOW-UP, OTHER SURGERY                     

 

 2017            PARAG PROCTOR ARNP            Ot            553.20   
         VENTRAL HERNIA NOS                     

 

 2017            PARAG PROCTOR ARNP            Ot            V10.42   
         HX-UTERUS MALIGNANCY NEC                     

 

 2017            PARAG PROCTOR ARNP            Ot            V67.09   
         SURGERY FOLLOW-UP, OTHER SURGERY                     

 

 2017            PARAG PROCTOR ARNP            Ot            V88.01   
         ACQUIRED ABSENCE OF BOTH CERVIX AND UTER                     

 

 2017            PARAG PORCTOR ARNP            Ot            183.0    
        MALIGN NEOPL OVARY                     

 

 2017            PARAG PROCTOR ARNP            Ot            553.1    
        UMBILICAL HERNIA                     

 

 2017            PARAG PROCTOR ARNP            Ot            553.20   
         VENTRAL HERNIA NOS                     

 

 2017            PARAG PROCTOR ARNP            Ot            571.8    
        CHRONIC LIVER DIS NEC                     

 

 2017            PARAG PROCTOR ARNP            Ot            789.03   
         ABDOMINAL PAIN, RIGHT LOWER QUADRANT                     

 

 2017            RENY ESQUIVEL            Ot            V10.42         
   HX-UTERUS MALIGNANCY NEC                     

 

 2017            RENY ESQUIVEL            Ot            V67.09         
   SURGERY FOLLOW-UP, OTHER SURGERY                     

 

 2017            RENY ESQUIVEL            Ot            V88.01         
   ACQUIRED ABSENCE OF BOTH CERVIX AND UTER                     

 

 2017            PARAG PROCTOR ARNP            Ot            553.20   
         VENTRAL HERNIA NOS                     

 

 2017            PARAG PROCTOR ARNP            Ot            V10.42   
         HX-UTERUS MALIGNANCY NEC                     

 

 2017            PARAG PROCTOR ARNP            Ot            V67.09   
         SURGERY FOLLOW-UP, OTHER SURGERY                     

 

 2017            PARAG PROCTOR ARNP            Ot            V88.01   
         ACQUIRED ABSENCE OF BOTH CERVIX AND UTER                     

 

 2017            RENY ESQUIVEL            Ot            C54.1          
  MALIGNANT NEOPLASM OF ENDOMETRIUM                     

 

 2017            PARAG PROCTOR ARNP            Ot            Z08      
      ENCNTR FOR FOLLOW-UP EXAM AFTER TRTMT FO                     

 

 2017            PROCTORPARAG VALIENTE ARNP            Ot            Z85.42   
         PERSONAL HISTORY OF MALIGNANT NEOPLASM O                     

 

 2017            PARAG PROCTOR ARNP            Ot            Z90.710  
          ACQUIRED ABSENCE OF BOTH CERVIX AND UTER                     

 

 2017            DASHA KNIGHT MD            Ot            R50.9       
     FEVER, UNSPECIFIED                     

 

 2017            DASHA KNIGHT MD            Ot            R60.0       
     LOCALIZED EDEMA                     

 

 2017            RENY ESQUIVEL            Ot            K43.9          
  VENTRAL HERNIA WITHOUT OBSTRUCTION OR GA                     

 

 2017            RENY ESQUIVEL            Ot            Z08            
ENCNTR FOR FOLLOW-UP EXAM AFTER TRTMT FO                     

 

 2017            RENY ESQUIVEL            Ot            Z85.42         
   PERSONAL HISTORY OF MALIGNANT NEOPLASM O                     

 

 2017            RENY ESQUIVEL MAYRA            Ot            Z90.710        
    ACQUIRED ABSENCE OF BOTH CERVIX AND UTER                     

 

 2017            DASHA KNIGHT MD            Ot            Z12.31      
      ENCNTR SCREEN MAMMOGRAM FOR MALIGNANT NE                     

 

 2017            DASHA KNIGHT MD            Ot            E78.00      
      PURE HYPERCHOLESTEROLEMIA, UNSPECIFIED                     

 

 2017            DASHA KNIGHT MD            Ot            R53.81      
      OTHER MALAISE                     

 

 2017            DASHA KNIGHT MD            Ot            R53.83      
      OTHER FATIGUE                     

 

 2017            RENY ESQUIVEL MAYRA            Ot            Z78.0          
  ASYMPTOMATIC MENOPAUSAL STATE                     

 

 2017            ALVIN RENY NUNEZ            Ot            K43.9          
  VENTRAL HERNIA WITHOUT OBSTRUCTION OR GA                     

 

 2017            ALVINRENY            Ot            Z08            
ENCNTR FOR FOLLOW-UP EXAM AFTER TRTMT FO                     

 

 2017            ALVINRENY            Ot            Z85.42         
   PERSONAL HISTORY OF MALIGNANT NEOPLASM O                     

 

 2017            ALVIN, RENY NUNEZ            Ot            Z90.710        
    ACQUIRED ABSENCE OF BOTH CERVIX AND UTER                     

 

 2018            DASHA KNIGHT MD            Ot            E78.00      
      PURE HYPERCHOLESTEROLEMIA, UNSPECIFIED                     

 

 2018            DASHA KNIGHT MD            Ot            R53.81      
      OTHER MALAISE                     

 

 2018            DASHA KNIGHT MD            Ot            R53.83      
      OTHER FATIGUE                     

 

 2018            ALVIN LAURIENIMESH MAYRA            Ot            M85.80         
   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                     

 

 2018            ALVIN LAURIENIMESH MAYRA            Ot            Z78.0          
  ASYMPTOMATIC MENOPAUSAL STATE                     

 

 2018            ALVIN, RENY NUNEZ            Ot            E78.00         
   PURE HYPERCHOLESTEROLEMIA, UNSPECIFIED                     

 

 2018            ALVIN RENY NUNEZ            Ot            K43.9          
  VENTRAL HERNIA WITHOUT OBSTRUCTION OR GA                     

 

 2018            ALVIN RENY NUNEZ            Ot            R53.81         
   OTHER MALAISE                     

 

 2018            ALVINRENY            Ot            R53.83         
   OTHER FATIGUE                     

 

 2018            ALVIN, RENY NUNEZ            Ot            Z08            
ENCNTR FOR FOLLOW-UP EXAM AFTER TRTMT FO                     

 

 2018            ALVINRENY            Ot            Z85.42         
   PERSONAL HISTORY OF MALIGNANT NEOPLASM O                     

 

 2018            ALVIN, REYN NUNEZ            Ot            Z90.710        
    ACQUIRED ABSENCE OF BOTH CERVIX AND UTER                     

 

 2018            RENY ESQUIVEL            Ot            E78.00         
   PURE HYPERCHOLESTEROLEMIA, UNSPECIFIED                     

 

 2018            RENY ESQUIVEL            Ot            K43.9          
  VENTRAL HERNIA WITHOUT OBSTRUCTION OR GA                     

 

 2018            RENY ESQUIVEL            Ot            R53.81         
   OTHER MALAISE                     

 

 2018            RENY ESQUIVEL            Ot            R53.83         
   OTHER FATIGUE                     

 

 2018            RENY ESQUIVEL            Ot            Z08            
ENCNTR FOR FOLLOW-UP EXAM AFTER TRTMT FO                     

 

 2018            RENY ESQUIVEL            Ot            Z85.42         
   PERSONAL HISTORY OF MALIGNANT NEOPLASM O                     

 

 2018            RENY ESQUIVEL            Ot            Z90.710        
    ACQUIRED ABSENCE OF BOTH CERVIX AND UTER                     

 

 2018            PARAG PROCTOR ARNP            Ot            V10.42   
         HX-UTERUS MALIGNANCY NEC                     

 

 2018            PARAG PROCTOR ARNP            Ot            V45.77   
         ACQRD ABSENCE OF GENITAL ORGANS                     

 

 2018            PARAG PROCTOR ARNP            Ot            V67.09   
         SURGERY FOLLOW-UP, OTHER SURGERY                     

 

 2018            RENY ESQUIVEL N            Ot            553.20         
   VENTRAL HERNIA NOS                     

 

 2018            RENY ESQUIVEL            Ot            V10.42         
   HX-UTERUS MALIGNANCY NEC                     

 

 2018            RENY ESQUIVEL            Ot            V45.77         
   ACQRD ABSENCE OF GENITAL ORGANS                     

 

 2018            RENY ESQUIVEL            Ot            V67.09         
   SURGERY FOLLOW-UP, OTHER SURGERY                     

 

 2018            PARAG PROCTOR ARNP            Ot            553.20   
         VENTRAL HERNIA NOS                     

 

 2018            PARAG PROCTOR ARNP            Ot            V10.42   
         HX-UTERUS MALIGNANCY NEC                     

 

 2018            PARAG PROCTOR ARNP            Ot            V67.09   
         SURGERY FOLLOW-UP, OTHER SURGERY                     

 

 2018            PARAG PROCTOR ARNP            Ot            V88.01   
         ACQUIRED ABSENCE OF BOTH CERVIX AND UTER                     

 

 2018            PARAG PROCTOR ARNP            Ot            183.0    
        MALIGN NEOPL OVARY                     

 

 2018            PARAG PROCTOR ARNP            Ot            553.1    
        UMBILICAL HERNIA                     

 

 2018            PARAG PROCTOR ARNP            Ot            553.20   
         VENTRAL HERNIA NOS                     

 

 2018            PARAG PROCTOR ARNP            Ot            571.8    
        CHRONIC LIVER DIS NEC                     

 

 2018            PARAG PROCTOR ARNP            Ot            789.03   
         ABDOMINAL PAIN, RIGHT LOWER QUADRANT                     

 

 2018            ALVIN RENY NUNEZ            Ot            V10.42         
   HX-UTERUS MALIGNANCY NEC                     

 

 2018            ALVIN RENY NUNEZ            Ot            V67.09         
   SURGERY FOLLOW-UP, OTHER SURGERY                     

 

 2018            ALVIN RENY NUNEZ            Ot            V88.01         
   ACQUIRED ABSENCE OF BOTH CERVIX AND UTER                     

 

 2018            PROCTORPARAG VALIENTE ARNP            Ot            553.20   
         VENTRAL HERNIA NOS                     

 

 2018            PARAG PROCTOR ARNP            Ot            V10.42   
         HX-UTERUS MALIGNANCY NEC                     

 

 2018            PARAG PROCTOR ARNP            Ot            V67.09   
         SURGERY FOLLOW-UP, OTHER SURGERY                     

 

 2018            PARAG PROCTOR ARNP            Ot            V88.01   
         ACQUIRED ABSENCE OF BOTH CERVIX AND UTER                     

 

 2018            ALVIN RENY NUNEZ            Ot            C54.1          
  MALIGNANT NEOPLASM OF ENDOMETRIUM                     

 

 2018            PARAG PROCTOR ARNP            Ot            Z08      
      ENCNTR FOR FOLLOW-UP EXAM AFTER TRTMT FO                     

 

 2018            PROCTORPARAG VALIENTE ARNP            Ot            Z85.42   
         PERSONAL HISTORY OF MALIGNANT NEOPLASM O                     

 

 2018            HITESHPARAG ARNP            Ot            Z90.710  
          ACQUIRED ABSENCE OF BOTH CERVIX AND UTER                     

 

 2018            DASHA KNIGHT MD            Ot            R50.9       
     FEVER, UNSPECIFIED                     

 

 2018            DASHA KNIGHT MD            Ot            R60.0       
     LOCALIZED EDEMA                     

 

 2018            RENY ESQUIVEL            Ot            E78.00         
   PURE HYPERCHOLESTEROLEMIA, UNSPECIFIED                     

 

 2018            RENY ESQUIVEL            Ot            K43.9          
  VENTRAL HERNIA WITHOUT OBSTRUCTION OR GA                     

 

 2018            RENY ESQUIVEL            Ot            R53.81         
   OTHER MALAISE                     

 

 2018            RENY ESQUIVEL            Ot            R53.83         
   OTHER FATIGUE                     

 

 2018            RENY ESQUIVEL            Ot            Z08            
ENCNTR FOR FOLLOW-UP EXAM AFTER TRTMT FO                     

 

 2018            RENY ESQUIVEL            Ot            Z85.42         
   PERSONAL HISTORY OF MALIGNANT NEOPLASM O                     

 

 2018            RENY ESQUIVEL            Ot            Z90.710        
    ACQUIRED ABSENCE OF BOTH CERVIX AND UTER                     

 

 2018            DASHA KNIGHT MD            Ot            Z12.31      
      ENCNTR SCREEN MAMMOGRAM FOR MALIGNANT NE                     

 

 2018            EUGENE DASHA BENITEZ Ot            E78.00      
      PURE HYPERCHOLESTEROLEMIA, UNSPECIFIED                     

 

 2018            DASHA KNIGHT MD            Ot            R53.81      
      OTHER MALAISE                     

 

 2018            DASHA KNIGHT MD, Ot            R53.83      
      OTHER FATIGUE                     

 

 2018            RENY ESQUIVEL            Ot            M85.80         
   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                     

 

 2018            RENY ESQUIVEL            Ot            Z78.0          
  ASYMPTOMATIC MENOPAUSAL STATE                     

 

 2018            DASHA KNIGHT MD, Ot            Z12.31      
      ENCNTR SCREEN MAMMOGRAM FOR MALIGNANT NE                     

 

 2018            DASHA KNIGHT MD, Ot            Z12.31      
      ENCNTR SCREEN MAMMOGRAM FOR MALIGNANT NE                     

 

 2018            CRISTAL BENITEZ, KURTIS PUGH            Ot            Z01.818   
         ENCOUNTER FOR OTHER PREPROCEDURAL EXAMIN                     



                                                                               
                                                                               
                                                                               
                                                                               
                                                                               
                                                                               
                                                                               
                                                                               
                                                                               
                                           



Procedures

      





 Code            Description            Performed By            Performed On   
     

 

             40.29                                  SIMP EXC LYMPH STRUC NEC   
                                10/21/2011        

 

             65.61                                  OTH REMOVE BOTH OVARIES/
TUBES                                   10/21/2011        

 

             68.49                                  OTHER AND UNSPECIFIED TOTAL 
ABDOMINAL HY                                   10/21/2011        

 

             71.3                                  LOCAL VULVAR EXCIS NEC      
                             10/21/2011        



                        



Results

      





 Test            Result            Range        









 Complete blood count (CBC) with automated white blood cell (WBC) differential 
- 16 13:00         









 Blood leukocytes automated count (number/volume)            5.8 10*3/uL       
     4.3-11.0        

 

 Blood erythrocytes automated count (number/volume)            4.72 10*6/uL    
        4.35-5.85        

 

 Venous blood hemoglobin measurement (mass/volume)            14.8 g/dL        
    11.5-16.0        

 

 Blood hematocrit (volume fraction)            43 %            35-52        

 

 Automated erythrocyte mean corpuscular volume            91 [foz_us]          
  80-99        

 

 Automated erythrocyte mean corpuscular hemoglobin (mass per erythrocyte)      
      31 pg            25-34        

 

 Automated erythrocyte mean corpuscular hemoglobin concentration measurement (
mass/volume)            34 g/dL            32-36        

 

 Automated erythrocyte distribution width ratio            12.9 %            
10.0-14.5        

 

 Automated blood platelet count (count/volume)            237 10*3/uL          
  130-400        

 

 Automated blood platelet mean volume measurement            9.8 [foz_us]      
      7.4-10.4        

 

 Automated blood neutrophils/100 leukocytes            44 %            42-75   
     

 

 Automated blood lymphocytes/100 leukocytes            39 %            12-44   
     

 

 Blood monocytes/100 leukocytes            13 %            0-12        

 

 Automated blood eosinophils/100 leukocytes            3 %            0-10     
   

 

 Automated blood basophils/100 leukocytes            1 %            0-10        

 

 Blood neutrophils automated count (number/volume)            2.6 10*3         
   1.8-7.8        

 

 Blood lymphocytes automated count (number/volume)            2.3 10*3         
   1.0-4.0        

 

 Blood monocytes automated count (number/volume)            0.8 10*3            
0.0-1.0        

 

 Automated eosinophil count            0.2 10*3/uL            0.0-0.3        

 

 Automated blood basophil count (count/volume)            0.1 10*3/uL          
  0.0-0.1        









 Methicillin resistant Staphylococcus aureus (MRSA) screening culture -  13:00         









 Methicillin resistant Staphylococcus aureus (MRSA) screening culture          
  NEG             NRG        









 Complete blood count (CBC) with automated white blood cell (WBC) differential 
- 16 05:08         









 Blood leukocytes automated count (number/volume)            11.7 10*3/uL      
      4.3-11.0        

 

 Blood erythrocytes automated count (number/volume)            4.22 10*6/uL    
        4.35-5.85        

 

 Venous blood hemoglobin measurement (mass/volume)            13.3 g/dL        
    11.5-16.0        

 

 Blood hematocrit (volume fraction)            39 %            35-52        

 

 Automated erythrocyte mean corpuscular volume            92 [foz_us]          
  80-99        

 

 Automated erythrocyte mean corpuscular hemoglobin (mass per erythrocyte)      
      32 pg            25-34        

 

 Automated erythrocyte mean corpuscular hemoglobin concentration measurement (
mass/volume)            34 g/dL            32-36        

 

 Automated erythrocyte distribution width ratio            12.6 %            
10.0-14.5        

 

 Automated blood platelet count (count/volume)            195 10*3/uL          
  130-400        

 

 Automated blood platelet mean volume measurement            9.7 [foz_us]      
      7.4-10.4        

 

 Automated blood neutrophils/100 leukocytes            80 %            42-75   
     

 

 Automated blood lymphocytes/100 leukocytes            8 %            12-44    
    

 

 Blood monocytes/100 leukocytes            12 %            0-12        

 

 Automated blood eosinophils/100 leukocytes            0 %            0-10     
   

 

 Automated blood basophils/100 leukocytes            0 %            0-10        

 

 Blood neutrophils automated count (number/volume)            9.3 10*3         
   1.8-7.8        

 

 Blood lymphocytes automated count (number/volume)            0.9 10*3         
   1.0-4.0        

 

 Blood monocytes automated count (number/volume)            1.4 10*3            
0.0-1.0        

 

 Automated eosinophil count            0.0 10*3/uL            0.0-0.3        

 

 Automated blood basophil count (count/volume)            0.0 10*3/uL          
  0.0-0.1        









 Complete urinalysis with reflex to culture - 16 07:58         









 Urine color determination            YELLOW             NRG        

 

 Urine clarity determination            CLEAR             NRG        

 

 Urine pH measurement by test strip            6             5-9        

 

 Specific gravity of urine by test strip            1.010             1.016-
1.022        

 

 Urine protein assay by test strip, semi-quantitative            NEGATIVE      
       NEGATIVE        

 

 Urine glucose detection by automated test strip            NEGATIVE           
  NEGATIVE        

 

 Erythrocytes detection in urine sediment by light microscopy            
NEGATIVE             NEGATIVE        

 

 Urine ketones detection by automated test strip            NEGATIVE           
  NEGATIVE        

 

 Urine nitrite detection by test strip            NEGATIVE             NEGATIVE
        

 

 Urine total bilirubin detection by test strip            NEGATIVE             
NEGATIVE        

 

 Urine urobilinogen measurement by automated test strip (mass/volume)          
  NORMAL             NORMAL        

 

 Urine leukocyte esterase detection by dipstick            NEGATIVE             
NEGATIVE        

 

 Automated urine sediment erythrocyte count by microscopy (number/high power 
field)            NONE             NRG        

 

 Automated urine sediment leukocyte count by microscopy (number/high power field
)            NONE             NRG        

 

 Bacteria detection in urine sediment by light microscopy            NEGATIVE  
           NRG        

 

 Crystals detection in urine sediment by light microscopy            NONE      
       NRG        

 

 Casts detection in urine sediment by light microscopy            NONE         
    NRG        

 

 Mucus detection in urine sediment by light microscopy            NEGATIVE     
        NRG        

 

 Complete urinalysis with reflex to culture            NO             NRG      
  









 Complete blood count (CBC) with automated white blood cell (WBC) differential 
- 17 12:55         









 Blood leukocytes automated count (number/volume)            8.7 10*3/uL       
     4.3-11.0        

 

 Blood erythrocytes automated count (number/volume)            4.76 10*6/uL    
        4.35-5.85        

 

 Venous blood hemoglobin measurement (mass/volume)            14.7 g/dL        
    11.5-16.0        

 

 Blood hematocrit (volume fraction)            42 %            35-52        

 

 Automated erythrocyte mean corpuscular volume            89 [foz_us]          
  80-99        

 

 Automated erythrocyte mean corpuscular hemoglobin (mass per erythrocyte)      
      31 pg            25-34        

 

 Automated erythrocyte mean corpuscular hemoglobin concentration measurement (
mass/volume)            35 g/dL            32-36        

 

 Automated erythrocyte distribution width ratio            15.4 %            
10.0-14.5        

 

 Automated blood platelet count (count/volume)            291 10*3/uL          
  130-400        

 

 Automated blood platelet mean volume measurement            9.8 [foz_us]      
      7.4-10.4        

 

 Automated blood neutrophils/100 leukocytes            64 %            42-75   
     

 

 Automated blood lymphocytes/100 leukocytes            19 %            12-44   
     

 

 Blood monocytes/100 leukocytes            15 %            0-12        

 

 Automated blood eosinophils/100 leukocytes            1 %            0-10     
   

 

 Automated blood basophils/100 leukocytes            1 %            0-10        

 

 Blood neutrophils automated count (number/volume)            5.5 10*3         
   1.8-7.8        

 

 Blood lymphocytes automated count (number/volume)            1.7 10*3         
   1.0-4.0        

 

 Blood monocytes automated count (number/volume)            1.3 10*3            
0.0-1.0        

 

 Automated eosinophil count            0.1 10*3/uL            0.0-0.3        

 

 Automated blood basophil count (count/volume)            0.1 10*3/uL          
  0.0-0.1        









 Blood lactic acid measurement (moles/volume) - 17 13:30         









 Blood lactic acid measurement (moles/volume)            1.5 mmol/L            
0.5-2.0        









 Comprehensive metabolic panel - 17 13:30         









 Serum or plasma sodium measurement (moles/volume)            141 mmol/L       
     135-145        

 

 Serum or plasma potassium measurement (moles/volume)            4.3 mmol/L    
        3.6-5.0        

 

 Serum or plasma chloride measurement (moles/volume)            109 mmol/L     
               

 

 Carbon dioxide            20 mmol/L            21-32        

 

 Serum or plasma anion gap determination (moles/volume)            12 mmol/L   
         5-14        

 

 Serum or plasma urea nitrogen measurement (mass/volume)            10 mg/dL   
         7-18        

 

 Serum or plasma creatinine measurement (mass/volume)            0.73 mg/dL    
        0.60-1.30        

 

 Serum or plasma urea nitrogen/creatinine mass ratio            14             
NRG        

 

 Serum or plasma creatinine measurement with calculation of estimated 
glomerular filtration rate            >             NRG        

 

 Serum or plasma glucose measurement (mass/volume)            119 mg/dL        
            

 

 Serum or plasma calcium measurement (mass/volume)            9.5 mg/dL        
    8.5-10.1        

 

 Serum or plasma total bilirubin measurement (mass/volume)            0.8 mg/dL
            0.1-1.0        

 

 Serum or plasma alkaline phosphatase measurement (enzymatic activity/volume)  
          79 U/L                    

 

 Serum or plasma aspartate aminotransferase measurement (enzymatic activity/
volume)            29 U/L            5-34        

 

 Serum or plasma alanine aminotransferase measurement (enzymatic activity/volume
)            31 U/L            0-55        

 

 Serum or plasma protein measurement (mass/volume)            7.4 g/dL         
   6.4-8.2        

 

 Serum or plasma albumin measurement (mass/volume)            4.3 g/dL         
   3.2-4.5        









 Bacterial blood culture - 17 13:30         









 Bacterial blood culture            NG             NRG        









 Bacterial blood culture - 17 13:39         









 Bacterial blood culture            NG             NRG        









 Lipid 1996 panel - 17 14:25         









 Serum or plasma triglyceride measurement (mass/volume)            164 mg/dL   
         <150        

 

 Serum or plasma cholesterol measurement (mass/volume)            229 mg/dL    
        < 200        

 

 Serum or plasma cholesterol in HDL measurement (mass/volume)            47 mg/
dL            40-60        

 

 Cholesterol in LDL [mass/volume] in serum or plasma by direct assay            
151 mg/dL            1-129        

 

 Serum or plasma cholesterol in VLDL measurement (mass/volume)            33 mg/
dL            5-40        









 THYROID STIMULATING HORMONE - 17 14:25         









 THYROID STIMULATING HORMONE            1.48 u[iU]/mL            0.35-4.94     
   









 CULTURE, URINE - 18 08:03         









 CULTURE, URINE, ROUTINE            SEE NOTE             NRG        



                                      



Encounters

      





 ACCT No.            Visit Date/Time            Discharge            Status    
        Pt. Type            Provider            Facility            Loc./Unit  
          Complaint        

 

 [id number]            2018 06:15:00            2018 12:08:00    
        DIS            Outpatient            KURTIS BEE MD            
[facility]            PREOP            COLONOSCOPY        

 

 [id number]            2018 08:33:00            2018 23:59:59    
        CLS            Outpatient            DASHA KNIGHT MD            [facility]            RAD            SCREENING        

 

 [id number]            2017 11:47:00            2017 23:59:59    
        CLS            Outpatient            RENY ESQUIVEL            [facility]            RAD            POST MENOPAUSAL Z78.0   
     

 

 [id number]            2017 14:30:00            2017 23:59:59    
        CLS            Outpatient            RENY ESQUIVEL            [facility]            ONC                     

 

 [id number]            2017 14:25:00            2017 23:59:59    
        CLS            Outpatient            DASHA KNIGHT MD            [facility]            LAB                     

 

 [id number]            10/31/2017 15:30:00            10/31/2017 23:59:59    
        CLS            Outpatient            DASHA KNIGHT MD            [facility]            RAD            SCREENING        

 

 [id number]            2016 10:07:00            2017 00:01:00    
        DIS            Outpatient            RENY ESQUIVEL            [facility]            ONC                     

 

 [id number]            2017 10:33:00            2017 11:22:00    
        DIS            Emergency            VERONICA PIÑA MD            [facility]            ER            NEEDS IV        

 

 [id number]            2017 12:37:00            2017 14:38:00    
        DIS            Emergency            VERONICA PIÑA MD            [facility]            ER            L SIDE/SHOULDER/ARM PAIN 
       

 

 [id number]            2016 08:33:00            2016 16:00:00    
        DIS            Outpatient            TACHO MAYES MD            [facility]            WOUNDCARE                     

 

 [id number]            2016 10:55:00            2016 23:59:59    
        CLS            Preadmit            RENY ESQUIVEL            [facility]            ONC                     

 

 [id number]            2016 14:49:00            2016 23:59:59    
        CLS            Outpatient            DASHA KNIGHT MD            [facility]            RAD            DVT        

 

 [id number]            2016 10:50:00            2016 16:48:00    
        DIS            Outpatient            KRISS HARVEY DO            [facility]            SDC            INCISIONAL HERNIA        

 

 [id number]            2016 05:39:00            2016 13:43:00    
        DIS            Outpatient            KRISS HARVEY DO            [facility]            PREOP            INCISIONAL HERNIA     
   

 

 [id number]            2016 10:02:00            2016 23:59:59    
        CLS            Outpatient            PARAG PROCTOR ARNP            
[facility]            ONC                     

 

 [id number]            12/10/2015 09:02:00            2016 00:01:00    
        DIS            Outpatient            RENY ESQUIVEL            [facility]            ONC                     

 

 [id number]            2015 10:58:00            2015 23:59:59    
        CLS            Outpatient            RENY ESQUIVEL            [facility]            RAD            ENDOMETRIAL CANCER      
  

 

 [id number]            2015 08:59:00            2015 23:59:59    
        CLS            Outpatient            PARAG PROCTOR ARNP            
[facility]            ONC                     

 

 [id number]            12/10/2014 09:11:00            12/10/2014 23:59:59    
        CLS            Outpatient            RENY ESQUIVEL            [facility]            ONC                     

 

 [id number]            09/10/2014 15:59:00            09/10/2014 23:59:59    
        CLS            Outpatient            PARAG PROCTOR ARNP            
[facility]            RAD            ENDOMETRIAL CANCER 
RIGHT QUAD PAIN        

 

 [id number]            2014 10:03:00            2014 23:59:59    
        CLS            Outpatient            PARAG PROCTOR ARNP            
[facility]            ONC                     

 

 [id number]            2013 09:03:00            2013 23:59:59    
        CLS            Outpatient            RENY ESQUIVEL            [facility]            ONC                     

 

 [id number]            2013 13:57:00            2013 23:59:59    
        CLS            Outpatient            PARAG PROCTOR ARNP            
[facility]            ONC                     

 

 [id number]            2018 10:30:00                         PEN       
     Preadlittle BEE MD, KUTRIS PUGH            [facility]            ENDO            SCREENING        

 

 [id number]            2012 12:43:00                                   
   Document Registration                                                       
     

 

 [id number]            2012 10:26:00                                   
   Document Registration                                                       
     

 

 [id number]            2012 12:53:00                                   
   Document Registration                                                       
     

 

 [id number]            2012 07:53:00                                   
   Document Registration                                                       
     

 

 [id number]            2012 08:44:00                                   
   Document Registration                                                       
     

 

 [id number]            2011 10:10:00                                   
   Document Registration                                                       
     

 

 [id number]            10/21/2011 05:47:00                                   
   Document Registration                                                       
     

 

 [id number]            10/18/2011 13:26:00                                   
   Document Registration                                                       
     

 

 [id number]            10/07/2011 11:18:00                                   
   Document Registration                                                       
     

 

 [id number]            2011 05:34:00                                   
   Document Registration                                                       
     

 

 [id number]            2011 08:20:00                                   
   Document Registration                                                       
     

 

 [id number]            2011 12:37:00                                   
   Document Registration                                                       
     

 

 [id number]            2010 11:02:00                                   
   Document Registration                                                       
     

 

 [id number]            2010 15:05:00                                   
   Document Registration                                                       
     

 

 [id number]            2015 09:00:41                         [place]            
Document Registration                                                          
  

 

 [id number]            2018 13:50:00                                      
Document Registration

## 2018-11-12 NOTE — ENDO PROCEDURE RECORD
Endo Procedure Report


Date of Procedure


Last Colonoscopy:  Yes


Nov 12, 2018


Surgeon (s)


KURTIS BEE MD





Post Procedure/Op Diagnosis





sigmoid diverticulosis





Procedure Performed





colonoscopy to cecum





Description of Procedure


Anesthesia Type:  Conscious Sedation


Specimen(s) collected/removed


none


Description of the Procedure


Indication for the procedure: This lady came in for screening colonoscopy.  She 

reported a positive family history.





Informed consent was obtained after reviewing the procedure in detail.





Description of the procedure: She was placed in left lateral decubitus position 

and her vital signs were monitored.  Conscious sedation was achieved using 

Versed and fentanyl.  Digital rectal examination was unremarkable.  The 

colonoscope was then introduced into the rectum and advanced to the cecum.  It 

was then withdrawn slowly and the mucosa examined in a systematic fashion.





Findings: Moderate diverticulosis.  No polyps were found.  She tolerated the 

procedure well and was taken back to the nursing area in a stable condition.





Impression: Screening colonoscopy.  No polyps.  Positive family history.  

Recommend repeating in 5 years.





Copy


Copies To 1:   DASHA KNIGHT MD, XAVIER M MD Nov 12, 2018 10:54

## 2018-11-12 NOTE — DISCHARGE INST-SIMPLE/STANDARD
Discharge Inst-Standard


Discharge Medications


New, Converted or Re-Newed RX:  Other





Patient Instructions/Follow Up


Plan of Care/Instructions/FU:  


Repeat colonoscopy in 5 years


Activity as Tolerated:  Yes


Discharge Diet:  No Restrictions











KURTIS BEE MD Nov 12, 2018 10:55

## 2018-11-12 NOTE — CONSCIOUS SEDATION/ASA
Conscious Sedation Pre-Proced


Time


09:43





ASA Score


2


For ASA 3 and 4: Consider anesthesia and medical clearance. Also, for 

patients with a history of failed moderate sedation consider anesthesia.

















Airway 


 


Lungs 


 


Heart 


 


 ASA score


 


 ASA 1: a normal healthy patient


 


 ASA 2:  a patient with a mild systemic disease (mid diabetes, controlled 

hypertension, obesity 


 


 ASA 3:  a patient with a severe systemic disease that limits activity  (angina

, COPD, prior Myocardial infarction)


 


 ASA 4:  a patient with an incapacitating disease that is a constant threat to 

life (CHF, renal failure)


 


 ASA 5:  a moribund patient not expected to survive 24 hrs.  (ruptured aneurysm)


 


 ASA 6:  a declared brain dead patient whose organs are being harvested.


 


 For emergent operations, add the letter E after the classification











Mallampati Classification


Grade 1





Sedation Plan


Discussed options with patient/fam


The patient is an appropriate candidate to undergo the planned procedure, 

sedation, and anesthesia.





The patient immediately re-assessed prior to indication.











KURTIS BEE MD Nov 12, 2018 09:43

## 2018-11-12 NOTE — XMS REPORT
[facility]

 Created on: 10/09/2018



Dacia Mack

External Reference #: [id number]

: 1950

Sex: Female



Demographics







 Address  [address] [place] [address]

 

 Preferred Language  Unknown

 

 Marital Status  Unknown

 

 Orthodoxy Affiliation  Unknown

 

 Race  Unknown

 

 Ethnic Group  Unknown





Author







 Author  PADMINI CRESPO

 

 Organization  [facility]

 

 Address  [address] [place]  [address]



 

 Phone  [phone]







Care Team Providers







 Care Team Member Name  Role  Phone

 

 PADMINI CRESPO  Unavailable  [phone]







PROBLEMS

Unknown Problems



ALLERGIES







 Substance  Reaction  Event Type  Date  Status

 

 Sulfacetamide Sodium  rash  Drug Allergy  29 Sep, 2018  Active







ENCOUNTERS







 Encounter  Location  Date  Diagnosis

 

 [facility]  [address] [facility] [id number] [phone]  29 Sep, 2018  Urinary frequency R35.0 and Acute cystitis without 
hematuria N30.00







IMMUNIZATIONS

No Known Immunizations



SOCIAL HISTORY

Never Assessed



REASON FOR VISIT

urinary frequency, denies dysuria. been like this for 2 weeks. [email or web address]



PLAN OF CARE







 Activity  Details









  









 Follow Up  prn Reason:







VITAL SIGNS







 Height  64 in  2018

 

 Weight  224.0 lbs  2018

 

 Temperature  98.3 degrees Fahrenheit  2018

 

 Heart Rate  72 bpm  2018

 

 Respiratory Rate  18   2018

 

 BMI  38.45 kg/m2  2018

 

 Blood pressure systolic  126 mmHg  2018

 

 Blood pressure diastolic  78 mmHg  2018







MEDICATIONS







 Medication  Instructions  Dosage  Frequency  Start Date  End Date  Duration  
Status

 

 Macrobid 100 MG  Orally every 12 hrs  1 capsule with food  12h  29 Sep, 2018  
2 Oct, 2018  3 days  Active

 

 Tylenol Arthritis Pain                    Active

 

 AZO Urinary Pain 97.5 MG  Orally Three times a day  2 tablets after meals  8h 
       2 day(s)  Active







RESULTS

No Results



PROCEDURES







 Procedure  Date Ordered  Result  Body Site

 

 URINALYSIS, AUTO, W/O SCOPE  2018      

 

 LAB NOT BILLED BY [facility]  2018      

 

 FQ VISIT ESTABLISHED PATIENT  2018      







INSTRUCTIONS





MEDICATIONS ADMINISTERED

No Known Medications



MEDICAL (GENERAL) HISTORY







 Type  Description  Date

 

 Medical History  Endometrial Cancer   

 

 Surgical History  MIKI with BSO  

 

 Surgical History  Umbilical hernia

## 2022-10-19 ENCOUNTER — HOSPITAL ENCOUNTER (OUTPATIENT)
Dept: HOSPITAL 75 - RAD | Age: 72
End: 2022-10-19
Attending: FAMILY MEDICINE
Payer: MEDICARE

## 2022-10-19 DIAGNOSIS — Z12.31: Primary | ICD-10-CM

## 2022-10-19 PROCEDURE — 77067 SCR MAMMO BI INCL CAD: CPT

## 2022-10-19 PROCEDURE — 77063 BREAST TOMOSYNTHESIS BI: CPT

## 2022-10-19 NOTE — DIAGNOSTIC IMAGING REPORT
Indication: Routine screening.



Comparison is made with prior mammogram from 11/2/2018 and

10/31/2017.



2-D and 3-D bilateral screening mammography was performed with

CAD.

CAD is utilized.



The current study was also evaluated with a Computer Aided

Detection (CAD) system.



Scattered fibroglandular densities are identified bilaterally.

Nodular densities noted in both breasts appears stable. No

spiculated mass or malignant-appearing microcalcifications are

seen. Axillae are unremarkable.



IMPRESSION: BI-RADS Category 2



No mammographic features suspicious for malignancy are

identified.



ACR BI-RADS Category 2: Benign findings.

Result letter will be mailed to the patient.

Note: At least 10% of breast cancer is not imaged by mammography.



Dictated by: 



  Dictated on workstation # [id number]